# Patient Record
Sex: MALE | Race: OTHER | NOT HISPANIC OR LATINO
[De-identification: names, ages, dates, MRNs, and addresses within clinical notes are randomized per-mention and may not be internally consistent; named-entity substitution may affect disease eponyms.]

---

## 2018-01-02 ENCOUNTER — TRANSCRIPTION ENCOUNTER (OUTPATIENT)
Age: 23
End: 2018-01-02

## 2018-09-06 PROBLEM — Z00.00 ENCOUNTER FOR PREVENTIVE HEALTH EXAMINATION: Status: ACTIVE | Noted: 2018-09-06

## 2018-09-13 ENCOUNTER — APPOINTMENT (OUTPATIENT)
Dept: RADIOLOGY | Facility: CLINIC | Age: 23
End: 2018-09-13
Payer: COMMERCIAL

## 2018-09-13 ENCOUNTER — APPOINTMENT (OUTPATIENT)
Dept: MRI IMAGING | Facility: CLINIC | Age: 23
End: 2018-09-13
Payer: COMMERCIAL

## 2018-09-13 ENCOUNTER — OUTPATIENT (OUTPATIENT)
Dept: OUTPATIENT SERVICES | Facility: HOSPITAL | Age: 23
LOS: 1 days | End: 2018-09-13
Payer: COMMERCIAL

## 2018-09-13 DIAGNOSIS — Z00.8 ENCOUNTER FOR OTHER GENERAL EXAMINATION: ICD-10-CM

## 2018-09-13 PROCEDURE — 73722 MRI JOINT OF LWR EXTR W/DYE: CPT | Mod: 26,LT

## 2018-09-13 PROCEDURE — 77002 NEEDLE LOCALIZATION BY XRAY: CPT | Mod: 26,76

## 2018-09-13 PROCEDURE — 27093 INJECTION FOR HIP X-RAY: CPT | Mod: LT

## 2018-09-13 PROCEDURE — 77002 NEEDLE LOCALIZATION BY XRAY: CPT

## 2018-09-13 PROCEDURE — 27093 INJECTION FOR HIP X-RAY: CPT

## 2018-09-13 PROCEDURE — 77002 NEEDLE LOCALIZATION BY XRAY: CPT | Mod: 26

## 2018-09-13 PROCEDURE — 73722 MRI JOINT OF LWR EXTR W/DYE: CPT

## 2018-09-13 PROCEDURE — 73722 MRI JOINT OF LWR EXTR W/DYE: CPT | Mod: 26,RT

## 2019-11-27 ENCOUNTER — APPOINTMENT (OUTPATIENT)
Dept: RADIOLOGY | Facility: CLINIC | Age: 24
End: 2019-11-27

## 2019-11-27 ENCOUNTER — OUTPATIENT (OUTPATIENT)
Dept: OUTPATIENT SERVICES | Facility: HOSPITAL | Age: 24
LOS: 1 days | End: 2019-11-27
Payer: COMMERCIAL

## 2019-11-27 DIAGNOSIS — Z00.8 ENCOUNTER FOR OTHER GENERAL EXAMINATION: ICD-10-CM

## 2019-11-27 PROCEDURE — 72040 X-RAY EXAM NECK SPINE 2-3 VW: CPT

## 2019-11-27 PROCEDURE — 72040 X-RAY EXAM NECK SPINE 2-3 VW: CPT | Mod: 26

## 2019-11-27 PROCEDURE — 72070 X-RAY EXAM THORAC SPINE 2VWS: CPT | Mod: 26

## 2019-11-27 PROCEDURE — 72070 X-RAY EXAM THORAC SPINE 2VWS: CPT

## 2020-01-24 ENCOUNTER — OUTPATIENT (OUTPATIENT)
Dept: OUTPATIENT SERVICES | Facility: HOSPITAL | Age: 25
LOS: 1 days | End: 2020-01-24
Payer: COMMERCIAL

## 2020-01-24 ENCOUNTER — APPOINTMENT (OUTPATIENT)
Dept: MRI IMAGING | Facility: CLINIC | Age: 25
End: 2020-01-24
Payer: COMMERCIAL

## 2020-01-24 DIAGNOSIS — Z00.8 ENCOUNTER FOR OTHER GENERAL EXAMINATION: ICD-10-CM

## 2020-01-24 PROCEDURE — 72141 MRI NECK SPINE W/O DYE: CPT

## 2020-01-24 PROCEDURE — 72141 MRI NECK SPINE W/O DYE: CPT | Mod: 26

## 2022-04-12 ENCOUNTER — TRANSCRIPTION ENCOUNTER (OUTPATIENT)
Age: 27
End: 2022-04-12

## 2022-06-15 ENCOUNTER — NON-APPOINTMENT (OUTPATIENT)
Age: 27
End: 2022-06-15

## 2022-06-18 ENCOUNTER — NON-APPOINTMENT (OUTPATIENT)
Age: 27
End: 2022-06-18

## 2022-07-04 ENCOUNTER — NON-APPOINTMENT (OUTPATIENT)
Age: 27
End: 2022-07-04

## 2023-04-02 ENCOUNTER — NON-APPOINTMENT (OUTPATIENT)
Age: 28
End: 2023-04-02

## 2023-07-21 ENCOUNTER — APPOINTMENT (OUTPATIENT)
Dept: ORTHOPEDIC SURGERY | Facility: CLINIC | Age: 28
End: 2023-07-21
Payer: COMMERCIAL

## 2023-07-21 VITALS — HEIGHT: 71 IN | WEIGHT: 190 LBS | BODY MASS INDEX: 26.6 KG/M2

## 2023-07-21 DIAGNOSIS — M54.9 DORSALGIA, UNSPECIFIED: ICD-10-CM

## 2023-07-21 DIAGNOSIS — M41.126 ADOLESCENT IDIOPATHIC SCOLIOSIS, LUMBAR REGION: ICD-10-CM

## 2023-07-21 DIAGNOSIS — M41.124 ADOLESCENT IDIOPATHIC SCOLIOSIS, THORACIC REGION: ICD-10-CM

## 2023-07-21 DIAGNOSIS — M54.50 LOW BACK PAIN, UNSPECIFIED: ICD-10-CM

## 2023-07-21 DIAGNOSIS — M51.36 OTHER INTERVERTEBRAL DISC DEGENERATION, LUMBAR REGION: ICD-10-CM

## 2023-07-21 PROCEDURE — 99204 OFFICE O/P NEW MOD 45 MIN: CPT

## 2023-07-21 PROCEDURE — 72100 X-RAY EXAM L-S SPINE 2/3 VWS: CPT

## 2023-07-21 RX ORDER — IBUPROFEN 800 MG/1
800 TABLET, FILM COATED ORAL
Qty: 90 | Refills: 0 | Status: ACTIVE | COMMUNITY
Start: 2023-07-21 | End: 1900-01-01

## 2023-07-21 NOTE — DISCUSSION/SUMMARY
[Medication Risks Reviewed] : Medication risks reviewed [de-identified] : He has symptoms of recurrent lower back pain associated with a list and muscle spasm he describes is typical of symptoms from lumbar degenerative disc disease.  He will rest and use moist heat.  He has been started on ibuprofen 800 mg 3 times a day as a nonsteroidal anti-inflammatory with instructions to continue it for 2 or 3 days after his symptoms have fully resolved.  He will call if there are problems with the medication or worsening of his symptoms.  I will see him for follow-up in 3 to 4 weeks on a as needed basis.  When he is doing well and off the medicine for 10 days or so he has been given a prescription to visit a physical therapist for instruction in lower back exercises with hopes of decreasing the future frequency and severity of these symptoms.

## 2023-07-21 NOTE — PHYSICAL EXAM
[de-identified] : He is fully alert and oriented with a normal mood and affect.  He is in no acute distress as a take the history.  He ambulates with a normal gait including tiptoe and heel walking.  There are no cutaneous abnormalities or palpable bony defects of the spine.  There is no evidence of shortness of breath or respiratory distress.  On stance evaluation there is a mild elevation of the left shoulder with a level pelvis.  There is no paravertebral muscle spasm, sciatic notch tenderness or trochanteric tenderness.  Forward flexion of the spine there is a small right thoracic prominence and there is a minimal waistline asymmetry.  His lower extremity neurological examination revealed 1+ symmetrical reflexes.  Motor power is normal to manual testing in all lower extremity groups and sensation is normal to light touch in all dermatomes.  Straight leg raising is negative to 90 degrees in the sitting position bilaterally.  His hips and his knees have a full and painless range of motion with normal stability.  Vascular examination shows no evidence of varicosities and there is no lymphedema.  There are no cutaneous abnormalities of the upper extremities or of the lower extremities. [de-identified] : In light of his complaints AP and lateral x-rays of the lumbar spine are obtained.  There is a minimal scoliosis.  Sagittal alignment is normal.  There is no evidence of a spondylolysis.  Disc heights are well maintained.

## 2023-07-21 NOTE — HISTORY OF PRESENT ILLNESS
[de-identified] : 27-year-old male has a 6-year history of intermittent mid and lower back pain with muscle spasms that can occur up to once a year and frequently associated with a list but without leg pain.  5 days ago he had a recurrence of symptoms when bending over to  a towel.  He is having right-sided mid and lower back pain again without leg pain.  He has not had associated neurologic symptoms of numbness, paresthesias or weakness.  The pain can be worse coughing and sneezing but no worse forcing to move his bowels.  He denies having night pain but yet tells me the pain can be worse lying down.  The pain is not aggravated by sitting standing or walking.  Treatment has been rest and heat and ibuprofen over-the-counter but only 400 mg twice a day.\par \par His past medical history is positive only for history of heartburn symptoms in the past but he has not had that for more than 2 years.

## 2023-09-22 ENCOUNTER — APPOINTMENT (OUTPATIENT)
Dept: PSYCHIATRY | Facility: CLINIC | Age: 28
End: 2023-09-22
Payer: SELF-PAY

## 2023-09-22 PROCEDURE — 99205 OFFICE O/P NEW HI 60 MIN: CPT

## 2023-10-06 ENCOUNTER — APPOINTMENT (OUTPATIENT)
Dept: PSYCHIATRY | Facility: CLINIC | Age: 28
End: 2023-10-06
Payer: SELF-PAY

## 2023-10-06 PROCEDURE — 99214 OFFICE O/P EST MOD 30 MIN: CPT

## 2023-10-13 ENCOUNTER — NON-APPOINTMENT (OUTPATIENT)
Age: 28
End: 2023-10-13

## 2023-10-15 ENCOUNTER — NON-APPOINTMENT (OUTPATIENT)
Age: 28
End: 2023-10-15

## 2023-10-17 ENCOUNTER — APPOINTMENT (OUTPATIENT)
Dept: PSYCHIATRY | Facility: CLINIC | Age: 28
End: 2023-10-17
Payer: COMMERCIAL

## 2023-10-17 VITALS — HEART RATE: 61 BPM | DIASTOLIC BLOOD PRESSURE: 81 MMHG | SYSTOLIC BLOOD PRESSURE: 121 MMHG

## 2023-10-17 PROCEDURE — 99214 OFFICE O/P EST MOD 30 MIN: CPT

## 2023-10-17 RX ORDER — PROPRANOLOL HYDROCHLORIDE 10 MG/1
10 TABLET ORAL DAILY
Qty: 30 | Refills: 0 | Status: ACTIVE | COMMUNITY
Start: 2023-10-17 | End: 1900-01-01

## 2023-11-06 ENCOUNTER — APPOINTMENT (OUTPATIENT)
Dept: PSYCHIATRY | Facility: CLINIC | Age: 28
End: 2023-11-06
Payer: SELF-PAY

## 2023-11-06 PROCEDURE — 99214 OFFICE O/P EST MOD 30 MIN: CPT

## 2023-11-06 RX ORDER — ESCITALOPRAM OXALATE 5 MG/1
5 TABLET ORAL
Qty: 30 | Refills: 0 | Status: DISCONTINUED | COMMUNITY
Start: 2023-10-06 | End: 2023-11-06

## 2023-11-06 RX ORDER — ESCITALOPRAM OXALATE 10 MG/1
10 TABLET ORAL
Qty: 30 | Refills: 0 | Status: DISCONTINUED | COMMUNITY
Start: 2023-10-06 | End: 2023-11-06

## 2023-11-14 ENCOUNTER — APPOINTMENT (OUTPATIENT)
Dept: PSYCHIATRY | Facility: CLINIC | Age: 28
End: 2023-11-14

## 2023-11-27 ENCOUNTER — APPOINTMENT (OUTPATIENT)
Dept: PSYCHIATRY | Facility: CLINIC | Age: 28
End: 2023-11-27
Payer: SELF-PAY

## 2023-11-27 PROCEDURE — 99214 OFFICE O/P EST MOD 30 MIN: CPT

## 2023-12-04 ENCOUNTER — RX RENEWAL (OUTPATIENT)
Age: 28
End: 2023-12-04

## 2023-12-11 ENCOUNTER — APPOINTMENT (OUTPATIENT)
Dept: PSYCHIATRY | Facility: CLINIC | Age: 28
End: 2023-12-11

## 2023-12-14 ENCOUNTER — NON-APPOINTMENT (OUTPATIENT)
Age: 28
End: 2023-12-14

## 2023-12-19 ENCOUNTER — APPOINTMENT (OUTPATIENT)
Dept: PSYCHIATRY | Facility: CLINIC | Age: 28
End: 2023-12-19
Payer: SELF-PAY

## 2023-12-19 PROCEDURE — 99214 OFFICE O/P EST MOD 30 MIN: CPT

## 2023-12-19 NOTE — HISTORY OF PRESENT ILLNESS
[FreeTextEntry1] : Social: Pt said that he is doing better this week than last week. Said that the primary difficulty is with intrusive thoughts. Said that he had prior testing for ADHD that was negative. Taiwo has been in law school and visiting on weekends during which times pt feels pressured to "keep it together", then feels guilty for feeling better when she is in law school. However, she has been on winter break all this past week and around, so pt attributes feeling better to being exposed more to his fialicja. Work got busier in the past 3 year. Denies gambling. Going on vacation with family to [place] soon, then to [place] with taiwo. Therapy: Ongoing therapy with Ray Campbell.   Medications: Lexapro: feels it helps with intrusive thoughts and anxiety. Issue with delayed ejaculation, not helped by Wellbutrin. Discussed the option to cross to Viibryd for reducing sexual side effect burden.  Wellbutrin: helping with energy/motivation. Trazodone: taking 100mg nightly, helps, still with residual late insomnia.  Mood: "pretty good, more optimistic" Intrusive thoughts: feeling "guilty", or that he will "be like this forever". Last week was spending 70-75% of the day having these thoughts. This week it is down to 30-40% of the day.  Uses what he learns in therapy to let thoughts just go, label the thoughts as anxious thoughts, repeat healthier thoughts.  Tries to accept thoughts and not block them out.  Reassurance seeking: decreased from other people. Makes feel better in the moment, but not the long term.  Anxiety: Currently 3/10, last week was 6/10. Panic attacks: denies Motivation: better, Wellbutrin helped Anhedonia: denies, enjoys seeing friends Appetite: decreased since started Wellbutrin XL Sleep: asleep 11pm, awake 6:30-7am, wants to wake up 7:30am. One interruption to use the bathroom.  Energy: better Focus: improved  Guilt/worthlessness: endorses during interval Thoughts of death: passive thoughts of wanting to "give up" last week, denies intent or plan.  Thoughts of harming self: denies Thoughts of harming others: denies  EtOH use: denies Drug use: denies Tobacco use: denies Caffeine use: 3 sodas per week.

## 2023-12-19 NOTE — PHYSICAL EXAM
[None] : none [Cooperative] : cooperative [Euthymic] : euthymic [Anxious] : anxious [Full] : full [Clear] : clear [Linear/Goal Directed] : linear/goal directed [Average] : average [WNL] : within normal limits [FreeTextEntry8] : "pretty good, more optimistic" [de-identified] : anxious to euthymic, congruent with stated mood, ranging to smiling, appropriate to content [FreeTextEntry7] : denies SIIP/NSSIIP/HIIP, no overt delusions

## 2023-12-19 NOTE — PLAN
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  On initial assessment, notably, pt had period of 2-3 weeks of anxious mood, amotivation/anhedonia, low appetite, low energy, poor focus, feelings of guilt, middle insomnia, in the context of intrusive thoughts about infidelity and talking with his partner about this. Given the family of of OCD (father, two sisters) and depression (mother), the differential includes OCD, MDD, gambling disorder, r/o anxiety (though anxiety is situationally related to intrusive thoughts, not general). Continuing Lexapro titration, partial improvement though side effects notable for nausea and dry mouth, anxiety.  10/6/2023: Anxiety and depressive symptom improved on Lexapro, intrusive thoughts decreased from 80% of time to 50% of time. Increasing Lexapro to 15mg daily dose. Delayed ejaculation as notable side effect to monitor. 10/17/2023: Discussed that transient side effects may persist for 2 weeks after dose increase, pt in agreement with the plan to maintain current dose of Lexapro 15mg nightly currently, with option to [facility]'s [facility] to be revisited in the future as needed, though Lexapro has had partial benefit and dose is not yet optimized. Pt in agreement with the plan to maintain at 15mg for 3 more weeks. 11/6/2023: Primary side effect of delayed ejaculation, denies other side effects as transient increase in anxiety resolved within 2 weeks or so of having increased the dose to Lexapro 15mg daily. Discussed the option for Wellbutrin in the future as an adjunct for sexual side effects. Anxiety improving 3/10 currently, intrusive thoughts 5-10 times a day of guilt about his past when around his fiance. Plan to increase Lexapro to 20mg daily to address intrusive thoughts. 11/27/2023: Low motivation, low energy, feeling "flat". To start Wellbutrin XL 150mg daily as an adjunct. 12/18/2023: Crossing from Lexapro to Viibryd due to delayed ejaculation, start Viibryd 10mg daily, decrease Lexapro to 10mg daily. Continuing Wellbutrin XL 150mg daily, and trazodone at bedtime as needed for sleep.  PLAN -Discussed the diagnosis, treatment, alternatives to recommended treatment, risk Vs benefits of treatment and no treatment and alternative treatments. -Medication: Decrease Lexapro to 10mg daily. Start Viibryd 10mg daily. Continue Wellbutrin XL 150mg. Continue trazodone 50mg QHS prn for sleep.  Discussed risks and benefits of Wellbutrin including but not limited to decreased seizure threshold, dizziness, lightheadedness, insomnia, dry mouth, constipation, weight loss, anorexia, hypertension, sweating, rash, GI upset, tremor, tinnitus, induction of verito.  Trazodone side effects including but not limited to sedation, dry mouth constipation, headache, serotonin syndrome, orthostatic hypotension and priapism discussed.  SSRI side effects including but not limited to GI side effects, headaches, dizziness, serotonin syndrome, hyponatremia, QT prolongation, weight gain, decreased libido, and black box warning of SI in patients younger than 24 were discussed. Propranolol side effects discussed including risk to lower heart rate and cause orthostasis/hypotension. -Discussed recommendation for aerobic exercise -Discussed sleep hygiene -Discussed aromatherapy for sleep and anxiety -Educated patient of importance of remaining abstinent from drugs and alcohol. -Emergency procedures were discussed: pt. educated to call 911 or go to nearest ER for worsening of symptoms/suicidal/homicidal ideation. -Seeing Dr. Ray Campbell for therapy. -RTC in 2 weeks or earlier as needed -Patient given opportunity to ask questions and their questions were answered and they expressed understanding and agreement with above plan.

## 2024-01-02 ENCOUNTER — APPOINTMENT (OUTPATIENT)
Dept: PSYCHIATRY | Facility: CLINIC | Age: 29
End: 2024-01-02
Payer: COMMERCIAL

## 2024-01-02 PROCEDURE — 99214 OFFICE O/P EST MOD 30 MIN: CPT

## 2024-01-02 RX ORDER — BUPROPION HYDROCHLORIDE 150 MG/1
150 TABLET, EXTENDED RELEASE ORAL DAILY
Qty: 30 | Refills: 0 | Status: DISCONTINUED | COMMUNITY
Start: 2023-11-27 | End: 2024-01-02

## 2024-01-02 RX ORDER — VILAZODONE HYDROCHLORIDE 10 MG/1
10 TABLET, FILM COATED ORAL
Qty: 30 | Refills: 0 | Status: DISCONTINUED | COMMUNITY
Start: 2023-12-19 | End: 2024-01-02

## 2024-01-02 NOTE — PHYSICAL EXAM
[None] : none [Cooperative] : cooperative [Euthymic] : euthymic [Anxious] : anxious [Full] : full [Clear] : clear [Linear/Goal Directed] : linear/goal directed [Average] : average [WNL] : within normal limits [FreeTextEntry8] : "pretty good, more optimistic" [de-identified] : anxious to euthymic, congruent with stated mood, ranging to smiling, appropriate to content [FreeTextEntry7] : denies SIIP/NSSIIP/HIIP, no overt delusions

## 2024-01-02 NOTE — PHYSICAL EXAM
[None] : none [Cooperative] : cooperative [Euthymic] : euthymic [Anxious] : anxious [Full] : full [Clear] : clear [Linear/Goal Directed] : linear/goal directed [Average] : average [WNL] : within normal limits [FreeTextEntry8] : "pretty good, more optimistic" [de-identified] : anxious to euthymic, congruent with stated mood, ranging to smiling, appropriate to content [FreeTextEntry7] : denies SIIP/NSSIIP/HIIP, no overt delusions

## 2024-01-02 NOTE — PLAN
[FreeTextEntry5] : On initial assessment 9/22/2023: 29 yo M, domiciled alone in 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  On initial assessment, notably, pt had period of 2-3 weeks of anxious mood, amotivation/anhedonia, low appetite, low energy, poor focus, feelings of guilt, middle insomnia, in the context of intrusive thoughts about infidelity and talking with his partner about this. Given the family of of OCD (father, two sisters) and depression (mother), the differential includes OCD, MDD, gambling disorder, r/o anxiety (though anxiety is situationally related to intrusive thoughts, not general). Continuing Lexapro titration, partial improvement though side effects notable for nausea and dry mouth, anxiety.  10/6/2023: Anxiety and depressive symptom improved on Lexapro, intrusive thoughts decreased from 80% of time to 50% of time. Increasing Lexapro to 15mg daily dose. Delayed ejaculation as notable side effect to monitor. 10/17/2023: Discussed that transient side effects may persist for 2 weeks after dose increase, pt in agreement with the plan to maintain current dose of Lexapro 15mg nightly currently, with [facility] to be revisited in the future as needed, though Lexapro has had partial benefit and dose is not yet optimized. Pt in agreement with the plan to maintain at 15mg for 3 more weeks. 11/6/2023: Primary side effect of delayed ejaculation, denies other side effects as transient increase in anxiety resolved within 2 weeks or so of having increased the dose to Lexapro 15mg daily. Discussed the option for Wellbutrin in the future as an adjunct for sexual side effects. Anxiety improving 3/10 currently, intrusive thoughts 5-10 times a day of guilt about his past when around his fiance. Plan to increase Lexapro to 20mg daily to address intrusive thoughts. 11/27/2023: Low motivation, low energy, feeling "flat". To start Wellbutrin XL 150mg daily as an adjunct. 12/18/2023: Crossing from Lexapro to Viibryd due to delayed ejaculation, start Viibryd 10mg daily, decrease Lexapro to 10mg daily. Continuing Wellbutrin XL 150mg daily, and trazodone at bedtime as needed for sleep. 1/2/2024: Did not start Viibryd due to financial cost. Partial efficacy of Lexapro, wants to stop Wellbutrin due to concern for increased anxiety. Plan to stop Wellbutrin. Continue Lexapro 20mg daily for now, pt to consider genetic testing to guide choice of switch. Also considering Prozac since it helped with family members. Continuing trazodone 50mg to 100mg nightly for insomnia.  PLAN -Discussed the diagnosis, treatment, alternatives to recommended treatment, risk Vs benefits of treatment and no treatment and alternative treatments. -Medication: Continue Lexapro 20mg daily. Stop Wellbutrin. . Continue trazodone 50mg to 100mg QHS prn for sleep.  Discussed risks and benefits of Wellbutrin including but not limited to decreased seizure threshold, dizziness, lightheadedness, insomnia, dry mouth, constipation, weight loss, anorexia, hypertension, sweating, rash, GI upset, tremor, tinnitus, induction of verito.  Trazodone side effects including but not limited to sedation, dry mouth constipation, headache, serotonin syndrome, orthostatic hypotension and priapism discussed.  SSRI side effects including but not limited to GI side effects, headaches, dizziness, serotonin syndrome, hyponatremia, QT prolongation, weight gain, decreased libido, and black box warning of SI in patients younger than 24 were discussed. Propranolol side effects discussed including risk to lower heart rate and cause orthostasis/hypotension. -Discussed recommendation for aerobic exercise -Discussed sleep hygiene -Discussed aromatherapy for sleep and anxiety -Educated patient of importance of remaining abstinent from drugs and alcohol. -Emergency procedures were discussed: pt. educated to call 911 or go to nearest ER for worsening of symptoms/suicidal/homicidal ideation. -Seeing Dr. Ray Campbell for therapy. -RTC in 1-2 weeks or earlier as needed -Patient given opportunity to ask questions and their questions were answered and they expressed understanding and agreement with above plan.

## 2024-01-02 NOTE — HISTORY OF PRESENT ILLNESS
[FreeTextEntry1] : Social: The past week anxiety has been worse. Going to [place] on vacation.  Medications: Lexapro: Pt stayed on Lexapro due to cost of Viibryd being too high, said would cost $175. Wants to stop Lexapro, but is considering genetic testing to guide the switch in antidepressant.  Trazodone: taking 50 to 100mg nightly, find it helps. Wellbutrin: Taking 150mg in the morning, but wants to stop.   Mood: "anxious" Intrusive thoughts: ongoing, 80-90% feeling guilty.  Compulsions: decreasing reassurance seeking, decreased googling. Focusing on acceptance. Gambling: denies Anxiety: worse in the past week. worse in the mornings, immediately feels very anxious in the morning with heart racing, wakes up feeling like this. Panic attacks: denies Motivation: overall good Anhedonia: denies Appetite: worse Sleep: asleep 11pm, awake 6:30-7am, wants to wake up 1-1.5 hours later. 2 interruptions Energy: fine Focus: worse when anxious Guilt/worthlessness: endorses Thoughts of death: denies Thoughts of harming self: denies Thoughts of harming others: denies  EtOH use: denies Drug use: denies Tobacco use: denies Caffeine use: 1 soda per week

## 2024-01-02 NOTE — PLAN
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  On initial assessment, notably, pt had period of 2-3 weeks of anxious mood, amotivation/anhedonia, low appetite, low energy, poor focus, feelings of guilt, middle insomnia, in the context of intrusive thoughts about infidelity and talking with his partner about this. Given the family of of OCD (father, two sisters) and depression (mother), the differential includes OCD, MDD, gambling disorder, r/o anxiety (though anxiety is situationally related to intrusive thoughts, not general). Continuing Lexapro titration, partial improvement though side effects notable for nausea and dry mouth, anxiety.  10/6/2023: Anxiety and depressive symptom improved on Lexapro, intrusive thoughts decreased from 80% of time to 50% of time. Increasing Lexapro to 15mg daily dose. Delayed ejaculation as notable side effect to monitor. 10/17/2023: Discussed that transient side effects may persist for 2 weeks after dose increase, pt in agreement with the plan to maintain current dose of Lexapro 15mg nightly currently, with [facility]'s [facility] to be revisited in the future as needed, though Lexapro has had partial benefit and dose is not yet optimized. Pt in agreement with the plan to maintain at 15mg for 3 more weeks. 11/6/2023: Primary side effect of delayed ejaculation, denies other side effects as transient increase in anxiety resolved within 2 weeks or so of having increased the dose to Lexapro 15mg daily. Discussed the option for Wellbutrin in the future as an adjunct for sexual side effects. Anxiety improving 3/10 currently, intrusive thoughts 5-10 times a day of guilt about his past when around his fiance. Plan to increase Lexapro to 20mg daily to address intrusive thoughts. 11/27/2023: Low motivation, low energy, feeling "flat". To start Wellbutrin XL 150mg daily as an adjunct. 12/18/2023: Crossing from Lexapro to Viibryd due to delayed ejaculation, start Viibryd 10mg daily, decrease Lexapro to 10mg daily. Continuing Wellbutrin XL 150mg daily, and trazodone at bedtime as needed for sleep. 1/2/2024: Did not start Viibryd due to financial cost. Partial efficacy of Lexapro, wants to stop Wellbutrin due to concern for increased anxiety. Plan to stop Wellbutrin. Continue Lexapro 20mg daily for now, pt to consider genetic testing to guide choice of switch. Also considering Prozac since it helped with family members. Continuing trazodone 50mg to 100mg nightly for insomnia.  PLAN -Discussed the diagnosis, treatment, alternatives to recommended treatment, risk Vs benefits of treatment and no treatment and alternative treatments. -Medication: Continue Lexapro 20mg daily. Stop Wellbutrin. . Continue trazodone 50mg to 100mg QHS prn for sleep.  Discussed risks and benefits of Wellbutrin including but not limited to decreased seizure threshold, dizziness, lightheadedness, insomnia, dry mouth, constipation, weight loss, anorexia, hypertension, sweating, rash, GI upset, tremor, tinnitus, induction of verito.  Trazodone side effects including but not limited to sedation, dry mouth constipation, headache, serotonin syndrome, orthostatic hypotension and priapism discussed.  SSRI side effects including but not limited to GI side effects, headaches, dizziness, serotonin syndrome, hyponatremia, QT prolongation, weight gain, decreased libido, and black box warning of SI in patients younger than 24 were discussed. Propranolol side effects discussed including risk to lower heart rate and cause orthostasis/hypotension. -Discussed recommendation for aerobic exercise -Discussed sleep hygiene -Discussed aromatherapy for sleep and anxiety -Educated patient of importance of remaining abstinent from drugs and alcohol. -Emergency procedures were discussed: pt. educated to call 911 or go to nearest ER for worsening of symptoms/suicidal/homicidal ideation. -Seeing Dr. Ray Campbell for therapy. -RTC in 1-2 weeks or earlier as needed -Patient given opportunity to ask questions and their questions were answered and they expressed understanding and agreement with above plan.

## 2024-01-04 NOTE — DISCUSSION/SUMMARY
[FreeTextEntry1] : Writer called pt to provide information for contacting [facility] [phone] so that pt can determine the cost of genetic testing. Nobody picked up, left voice message.

## 2024-01-04 NOTE — DISCUSSION/SUMMARY
[FreeTextEntry1] : Writer called pt to provide information for contacting [facility] [phone] so that pt can determine the cost of genetic testing. Nobody picked up, left voice message.

## 2024-01-23 ENCOUNTER — APPOINTMENT (OUTPATIENT)
Dept: PSYCHIATRY | Facility: CLINIC | Age: 29
End: 2024-01-23

## 2024-01-23 NOTE — DISCUSSION/SUMMARY
[FreeTextEntry1] : Writer called pt since pt was not showing up for 8:30am appt, pt said that he had thought the appt was Thursday instead of today, asked to reschedule. Pt indicated that he is probably not interested in pursuing genetic testing at this time, will reschedule the missed appt to discuss medication options. No acute issues elicited at this time.

## 2024-01-29 NOTE — PLAN
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  On initial assessment, notably, pt had period of 2-3 weeks of anxious mood, amotivation/anhedonia, low appetite, low energy, poor focus, feelings of guilt, middle insomnia, in the context of intrusive thoughts about infidelity and talking with his partner about this. Given the family of of OCD (father, two sisters) and depression (mother), the differential includes OCD, MDD, gambling disorder, r/o anxiety (though anxiety is situationally related to intrusive thoughts, not general). Continuing Lexapro titration, partial improvement though side effects notable for nausea and dry mouth, anxiety.  10/6/2023: Anxiety and depressive symptom improved on Lexapro, intrusive thoughts decreased from 80% of time to 50% of time. Increasing Lexapro to 15mg daily dose. Delayed ejaculation as notable side effect to monitor. 10/17/2023: Discussed that transient side effects may persist for 2 weeks after dose increase, pt in agreement with the plan to maintain current dose of Lexapro 15mg nightly currently, with [facility] to be revisited in the future as needed, though Lexapro has had partial benefit and dose is not yet optimized. Pt in agreement with the plan to maintain at 15mg for 3 more weeks. 11/6/2023: Primary side effect of delayed ejaculation, denies other side effects as transient increase in anxiety resolved within 2 weeks or so of having increased the dose to Lexapro 15mg daily. Discussed the option for Wellbutrin in the future as an adjunct for sexual side effects. Anxiety improving 3/10 currently, intrusive thoughts 5-10 times a day of guilt about his past when around his fiance. Plan to increase Lexapro to 20mg daily to address intrusive thoughts. 11/27/2023: Low motivation, low energy, feeling "flat". To start Wellbutrin XL 150mg daily as an adjunct. 12/18/2023: Crossing from Lexapro to Viibryd due to delayed ejaculation, start Viibryd 10mg daily, decrease Lexapro to 10mg daily. Continuing Wellbutrin XL 150mg daily, and trazodone at bedtime as needed for sleep. 1/2/2024: Did not start Viibryd due to financial cost. Partial efficacy of Lexapro, wants to stop Wellbutrin due to concern for increased anxiety. Plan to stop Wellbutrin. Continue Lexapro 20mg daily for now, pt to consider genetic testing to guide choice of switch. Also considering Prozac since it helped with family members. Continuing trazodone 50mg to 100mg nightly for insomnia. 1/8/2024:  PLAN -Discussed the diagnosis, treatment, alternatives to recommended treatment, risk Vs benefits of treatment and no treatment and alternative treatments. -Medication: Continue Lexapro 20mg daily. Stop Wellbutrin.. Continue trazodone 50mg to 100mg QHS prn for sleep.  Discussed risks and benefits of Wellbutrin including but not limited to decreased seizure threshold, dizziness, lightheadedness, insomnia, dry mouth, constipation, weight loss, anorexia, hypertension, sweating, rash, GI upset, tremor, tinnitus, induction of verito.  Trazodone side effects including but not limited to sedation, dry mouth constipation, headache, serotonin syndrome, orthostatic hypotension and priapism discussed.  SSRI side effects including but not limited to GI side effects, headaches, dizziness, serotonin syndrome, hyponatremia, QT prolongation, weight gain, decreased libido, and black box warning of SI in patients younger than 24 were discussed. Propranolol side effects discussed including risk to lower heart rate and cause orthostasis/hypotension. -Discussed recommendation for aerobic exercise -Discussed sleep hygiene -Discussed aromatherapy for sleep and anxiety -Educated patient of importance of remaining abstinent from drugs and alcohol. -Emergency procedures were discussed: pt. educated to call 911 or go to nearest ER for worsening of symptoms/suicidal/homicidal ideation. -Seeing Dr. Ray Campbell for therapy. -RTC in 1-2 weeks or earlier as needed -Patient given opportunity to ask questions and their questions were answered and they expressed understanding and agreement with above plan.

## 2024-01-29 NOTE — HISTORY OF PRESENT ILLNESS
[FreeTextEntry1] : Social: Discussed option for Genom[facility] genetic testing for medication.  Medications: Lexapro: Wellbutrin: Trazodone: Genomind:  Mood: Intrusive Thoughts: Compulsions: Anxiety: Panic attacks: Motivation: Anhedonia: Appetite: Sleep: Energy: Focus: Guilt/worthlessness: Thoughts of death: Thoughts of harming self: Thoughts of harming others:  EtOH use: Drug use: Tobacco use: Caffeine use:

## 2024-01-29 NOTE — PLAN
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  On initial assessment, notably, pt had period of 2-3 weeks of anxious mood, amotivation/anhedonia, low appetite, low energy, poor focus, feelings of guilt, middle insomnia, in the context of intrusive thoughts about infidelity and talking with his partner about this. Given the family of of OCD (father, two sisters) and depression (mother), the differential includes OCD, MDD, gambling disorder, r/o anxiety (though anxiety is situationally related to intrusive thoughts, not general). Continuing Lexapro titration, partial improvement though side effects notable for nausea and dry mouth, anxiety.  10/6/2023: Anxiety and depressive symptom improved on Lexapro, intrusive thoughts decreased from 80% of time to 50% of time. Increasing Lexapro to 15mg daily dose. Delayed ejaculation as notable side effect to monitor. 10/17/2023: Discussed that transient side effects may persist for 2 weeks after dose increase, pt in agreement with the plan to maintain current dose of Lexapro 15mg nightly currently, [facility] to be revisited in the future as needed, though Lexapro has had partial benefit and dose is not yet optimized. Pt in agreement with the plan to maintain at 15mg for 3 more weeks. 11/6/2023: Primary side effect of delayed ejaculation, denies other side effects as transient increase in anxiety resolved within 2 weeks or so of having increased the dose to Lexapro 15mg daily. Discussed the option for Wellbutrin in the future as an adjunct for sexual side effects. Anxiety improving 3/10 currently, intrusive thoughts 5-10 times a day of guilt about his past when around his fiance. Plan to increase Lexapro to 20mg daily to address intrusive thoughts. 11/27/2023: Low motivation, low energy, feeling "flat". To start Wellbutrin XL 150mg daily as an adjunct. 12/18/2023: Crossing from Lexapro to Viibryd due to delayed ejaculation, start Viibryd 10mg daily, decrease Lexapro to 10mg daily. Continuing Wellbutrin XL 150mg daily, and trazodone at bedtime as needed for sleep. 1/2/2024: Did not start Viibryd due to financial cost. Partial efficacy of Lexapro, wants to stop Wellbutrin due to concern for increased anxiety. Plan to stop Wellbutrin. Continue Lexapro 20mg daily for now, pt to consider genetic testing to guide choice of switch. Also considering Prozac since it helped with family members. Continuing trazodone 50mg to 100mg nightly for insomnia. 1/8/2024:  PLAN -Discussed the diagnosis, treatment, alternatives to recommended treatment, risk Vs benefits of treatment and no treatment and alternative treatments. -Medication: Continue Lexapro 20mg daily. Stop Wellbutrin.. Continue trazodone 50mg to 100mg QHS prn for sleep.  Discussed risks and benefits of Wellbutrin including but not limited to decreased seizure threshold, dizziness, lightheadedness, insomnia, dry mouth, constipation, weight loss, anorexia, hypertension, sweating, rash, GI upset, tremor, tinnitus, induction of verito.  Trazodone side effects including but not limited to sedation, dry mouth constipation, headache, serotonin syndrome, orthostatic hypotension and priapism discussed.  SSRI side effects including but not limited to GI side effects, headaches, dizziness, serotonin syndrome, hyponatremia, QT prolongation, weight gain, decreased libido, and black box warning of SI in patients younger than 24 were discussed. Propranolol side effects discussed including risk to lower heart rate and cause orthostasis/hypotension. -Discussed recommendation for aerobic exercise -Discussed sleep hygiene -Discussed aromatherapy for sleep and anxiety -Educated patient of importance of remaining abstinent from drugs and alcohol. -Emergency procedures were discussed: pt. educated to call 911 or go to nearest ER for worsening of symptoms/suicidal/homicidal ideation. -Seeing Dr. Ray Campbell for therapy. -RTC in 1-2 weeks or earlier as needed -Patient given opportunity to ask questions and their questions were answered and they expressed understanding and agreement with above plan.

## 2024-02-08 ENCOUNTER — APPOINTMENT (OUTPATIENT)
Dept: PSYCHIATRY | Facility: CLINIC | Age: 29
End: 2024-02-08
Payer: SELF-PAY

## 2024-02-08 PROCEDURE — 99214 OFFICE O/P EST MOD 30 MIN: CPT

## 2024-02-08 NOTE — HISTORY OF PRESENT ILLNESS
[FreeTextEntry1] : Social: Went to [place] for work recently, and saw grandparents. Basketball season ended, to resume in Spring. Doing [employer] training once a week. Overall works out 4 times per week.  ISTOP reviewed. Pt saw Fei Eason during the interval, an outpatient psychiatrist, does not want to continue with him. Was prescribed Klonopin 0.5mg 30 quantity for 15 days.  Medications: Lexapro: side effect of delayed ejaculation has improved. However, feels that intrusive thoughts could be better. Denies other side effect. Discussed the plan to increase to 30mg daily, and start Klonopin 0.5mg daily as needed for anxiety given hx of anxiety and nausea with prior dose increases.  Trazodone: taking nightly, one night did not take it and woke up 3am. 70% of the time takes 100mg, other 30% takes 50mg.  Wellbutrin: stopped, no issues since stopping  Intrusive thoughts 75% of the day about pt feeling he is holding in secret about his past regrets, and second most is concern that he will not get back to normal. Mood: "ok" Anxiety: 6/10 Panic attacks: denies Motivation: "lately not great" Anhedonia: denies Appetite: "fine" Sleep: asleep 11:30pm, waking up 6:30am wants to wake up later +late insomnia. 1-2 interruptions to use the restroom. Energy: lower than normal Focus: lower than normal Guilt/worthlessness: endorses feelings of guilt, less feelings of worthlessness.  Thoughts of death: denies Thoughts of harming self: denies Thoughts of harming others: denies  EtOH use: denies Drug use: denies Tobacco use: denies Caffeine use: soda once per week, denies coffee, one tea every other week caffeinated.

## 2024-02-08 NOTE — PHYSICAL EXAM
[None] : none [Cooperative] : cooperative [Euthymic] : euthymic [Anxious] : anxious [Full] : full [Clear] : clear [Linear/Goal Directed] : linear/goal directed [Average] : average [WNL] : within normal limits [FreeTextEntry8] : "pretty good, more optimistic" [de-identified] : anxious to euthymic, congruent with stated mood, ranging to smiling, appropriate to content [FreeTextEntry7] : denies SIIP/NSSIIP/HIIP, no overt delusions

## 2024-02-08 NOTE — PLAN
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  On initial assessment, notably, pt had period of 2-3 weeks of anxious mood, amotivation/anhedonia, low appetite, low energy, poor focus, feelings of guilt, middle insomnia, in the context of intrusive thoughts about infidelity and talking with his partner about this. Given the family of of OCD (father, two sisters) and depression (mother), the differential includes OCD, MDD, gambling disorder, r/o anxiety (though anxiety is situationally related to intrusive thoughts, not general). Continuing Lexapro titration, partial improvement though side effects notable for nausea and dry mouth, anxiety.  10/6/2023: Anxiety and depressive symptom improved on Lexapro, intrusive thoughts decreased from 80% of time to 50% of time. Increasing Lexapro to 15mg daily dose. Delayed ejaculation as notable side effect to monitor. 10/17/2023: Discussed that transient side effects may persist for 2 weeks after dose increase, pt in agreement with the plan to maintain current dose of Lexapro 15mg nightly currently, [facility] to be revisited in the future as needed, though Lexapro has had partial benefit and dose is not yet optimized. Pt in agreement with the plan to maintain at 15mg for 3 more weeks. 11/6/2023: Primary side effect of delayed ejaculation, denies other side effects as transient increase in anxiety resolved within 2 weeks or so of having increased the dose to Lexapro 15mg daily. Discussed the option for Wellbutrin in the future as an adjunct for sexual side effects. Anxiety improving 3/10 currently, intrusive thoughts 5-10 times a day of guilt about his past when around his fiance. Plan to increase Lexapro to 20mg daily to address intrusive thoughts. 11/27/2023: Low motivation, low energy, feeling "flat". To start Wellbutrin XL 150mg daily as an adjunct. 12/18/2023: Crossing from Lexapro to Viibryd due to delayed ejaculation, start Viibryd 10mg daily, decrease Lexapro to 10mg daily. Continuing Wellbutrin XL 150mg daily, and trazodone at bedtime as needed for sleep. 1/2/2024: Did not start Viibryd due to financial cost. Partial efficacy of Lexapro, wants to stop Wellbutrin due to concern for increased anxiety. Plan to stop Wellbutrin. Continue Lexapro 20mg daily for now, pt to consider genetic testing to guide choice of switch. Also considering Prozac since it helped with family members. Continuing trazodone 50mg to 100mg nightly for insomnia. 2/8/2024: Anxiety 6/10, intrusive thoughts 75% of the time. Plan to increase Lexapro to 30mg daily, pt received Klonopin 0.5mg prescription, to take as needed for anxiety induced by transient side effects of dose increase.  PLAN -Discussed the diagnosis, treatment, alternatives to recommended treatment, risk Vs benefits of treatment and no treatment and alternative treatments. -Medication: Increase Lexapro to 30mg daily. Stopped Wellbutrin.. Continue trazodone 50mg to 100mg QHS prn for sleep. Klonopin 0.5mg daily as needed for anxiety. BZD side effects: Discussed with patient adverse effects of longer term/frequent use of BZD, potential to develop tolerance to the dose effects and develop dependence, and potential for addiction. Advise patient not to drive or operate heavy machinery immediately after taking the medication. Also educated patient about safe use/and keeping meds safely, and to not share medications with family/friends. Advised not to combine BZD with EtOH.  Discussed risks and benefits of Wellbutrin including but not limited to decreased seizure threshold, dizziness, lightheadedness, insomnia, dry mouth, constipation, weight loss, anorexia, hypertension, sweating, rash, GI upset, tremor, tinnitus, induction of verito.  Trazodone side effects including but not limited to sedation, dry mouth constipation, headache, serotonin syndrome, orthostatic hypotension and priapism discussed.  SSRI side effects including but not limited to GI side effects, headaches, dizziness, serotonin syndrome, hyponatremia, QT prolongation, weight gain, decreased libido, and black box warning of SI in patients younger than 24 were discussed. Propranolol side effects discussed including risk to lower heart rate and cause orthostasis/hypotension. -Discussed recommendation for aerobic exercise -Discussed sleep hygiene -Discussed aromatherapy for sleep and anxiety -Educated patient of importance of remaining abstinent from drugs and alcohol. -Emergency procedures were discussed: pt. educated to call 911 or go to nearest ER for worsening of symptoms/suicidal/homicidal ideation. -Seeing Dr. Ray Campbell for therapy. -RTC in 4 weeks or earlier as needed -Patient given opportunity to ask questions and their questions were answered and they expressed understanding and agreement with above plan.  ISTOP Reference #: [id number]  Practitioner Count: 1 Pharmacy Count: 1 Current Opioid Prescriptions: 0 Current Benzodiazepine Prescriptions: 1 Current Stimulant Prescriptions: 0   Patient Demographic Information (PDI)     PDI	First Name	Last Name	Birth Date	Gender	Street Address	[facility]	Zip Code [place]	Eusebio Avilesantony	1995	Male	[address] [place] [facility]	[id number]  Prescription Information    PDI Filter:   PDI	My Rx	Current Rx	Drug Type	Rx Written	Rx Dispensed	Drug	Quantity	Days Supply	Prescriber Name	Prescriber JACOB #	Payment Method	Dispenser A	N	Y	B	01/23/2024	01/25/2024	clonazepam 0.5 mg tablet	30	15	Fei Quiros	[id number]	Insurance	[facility] #[id number]

## 2024-03-05 ENCOUNTER — APPOINTMENT (OUTPATIENT)
Dept: PSYCHIATRY | Facility: CLINIC | Age: 29
End: 2024-03-05
Payer: SELF-PAY

## 2024-03-05 PROCEDURE — 99214 OFFICE O/P EST MOD 30 MIN: CPT

## 2024-03-05 PROCEDURE — G2211 COMPLEX E/M VISIT ADD ON: CPT

## 2024-03-05 NOTE — PHYSICAL EXAM
[None] : none [Cooperative] : cooperative [Anxious] : anxious [Full] : full [Clear] : clear [Linear/Goal Directed] : linear/goal directed [Average] : average [WNL] : within normal limits [de-identified] : anxious, congruent with stated mood, ranging to smiling, appropriate to content [FreeTextEntry7] : denies SIIP/NSSIIP/HIIP, no overt delusions

## 2024-03-05 NOTE — HISTORY OF PRESENT ILLNESS
[FreeTextEntry1] : Social: ISTOP reviewed Seeing Dr. Campbell weekly, pt feels it is helpful for learning about his obsessions.  Medications: Lexapro 30mg: side effect of delayed ejaculation, otherwise tolerated well. Was feeling better, but for the past week had high anxiety and intrusive thoughts. Started feeling better about 1 week after starting 30mg, felt good for 2.5 weeks, then felt poorly for 1 week.  Advised to check EKG and BMP with PCP. Klonopin 0.5mg: has not taken it. Trazodone 50mg to 100mg: taking 50mg nightly, once a week or so 100mg.  Intrusive thoughts: present for 75% of the time for the past week, for weeks prior was about 30% Compulsions: confessing stuff to fiance that does not seem needed to talk about, reassurance seeking from fiance. Mood: "not great" Anxiety: 6/10 Panic attacks: denies Motivation: decreased Anhedonia: denies Appetite: high Sleep: asleep 11:30pm, waking up 6:30am wants to wake up later +late insomnia. 1-2 interruptions to use the restroom. Energy: low Focus: low Guilt/worthlessness: feelings of guilt Thoughts of death: denies Thoughts of harming self: denies Thoughts of harming others: denies  EtOH use: denies Drug use: denies Tobacco use: denies Caffeine use: soda once per week, no coffee  A and B student in school, at [employer] got 3.3. Prior ADHD diagnosis: denies. Said that he was "a troublemaker, very hyper" when he was younger. Sister and father have ADHD. Pt was tested at age 7 or 8 years old and pt was not diagnosed with ADHD.  Symptoms of ADHD prior to age 13: Inattention- Careless mistakes or lack of close attention to details: denies Difficulty sustaining attention: denies Not listen when spoken to directly: denies Does not follow through on instructions: denies Difficulty organizing tasks and activities: denies Avoids, dislikes or is reluctant to engage in tasks with sustained mental effort: denies Loses necessary things for tasks: denies Easily distracted: denies Forgetful in daily activities: denies  Hyperactivity- Often fidgets or taps hands or feet or squirms in seat. denies Often leaves seat when remaining seated is expected. denies Runs about or climbs when not appropriate. denies Often unable to play or engage in leisure activities quietly. denies Often on the go, as if driven by a motor: denies Talks excessively: denies Blurts out answers before question completed: denies Difficulty waiting their turn: denies Interrupts others: denies

## 2024-03-05 NOTE — PLAN
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  On initial assessment, notably, pt had period of 2-3 weeks of anxious mood, amotivation/anhedonia, low appetite, low energy, poor focus, feelings of guilt, middle insomnia, in the context of intrusive thoughts about infidelity and talking with his partner about this. Given the family of of OCD (father, two sisters) and depression (mother), the differential includes OCD, MDD, gambling disorder, r/o anxiety (though anxiety is situationally related to intrusive thoughts, not general). Continuing Lexapro titration, partial improvement though side effects notable for nausea and dry mouth, anxiety.  10/6/2023: Anxiety and depressive symptom improved on Lexapro, intrusive thoughts decreased from 80% of time to 50% of time. Increasing Lexapro to 15mg daily dose. Delayed ejaculation as notable side effect to monitor. 10/17/2023: Discussed that transient side effects may persist for 2 weeks after dose increase, pt in agreement with the plan to maintain current dose of Lexapro 15mg nightly currently, with option [facility]'s [facility]) to be revisited in the future as needed, though Lexapro has had partial benefit and dose is not yet optimized. Pt in agreement with the plan to maintain at 15mg for 3 more weeks. 11/6/2023: Primary side effect of delayed ejaculation, denies other side effects as transient increase in anxiety resolved within 2 weeks or so of having increased the dose to Lexapro 15mg daily. Discussed the option for Wellbutrin in the future as an adjunct for sexual side effects. Anxiety improving 3/10 currently, intrusive thoughts 5-10 times a day of guilt about his past when around his fiance. Plan to increase Lexapro to 20mg daily to address intrusive thoughts. 11/27/2023: Low motivation, low energy, feeling "flat". To start Wellbutrin XL 150mg daily as an adjunct. 12/18/2023: Crossing from Lexapro to Viibryd due to delayed ejaculation, start Viibryd 10mg daily, decrease Lexapro to 10mg daily. Continuing Wellbutrin XL 150mg daily, and trazodone at bedtime as needed for sleep. 1/2/2024: Did not start Viibryd due to financial cost. Partial efficacy of Lexapro, wants to stop Wellbutrin due to concern for increased anxiety. Plan to stop Wellbutrin. Continue Lexapro 20mg daily for now, pt to consider genetic testing to guide choice of switch. Also considering Prozac since it helped with family members. Continuing trazodone 50mg to 100mg nightly for insomnia. 2/8/2024: Anxiety 6/10, intrusive thoughts 75% of the time. Plan to increase Lexapro to 30mg daily, pt received Klonopin 0.5mg prescription, to take as needed for anxiety induced by transient side effects of dose increase. 3/5/2024: Continuing Lexapro 30mg, trazodone 50mg to 100mg at bedtime, has not taken Klonopin. Plan to continue current meds, discussed option for clomipramine or Luvox in the future if needed. Does not meet criteria for ADHD.  PLAN -Discussed the diagnosis, treatment, alternatives to recommended treatment, risk Vs benefits of treatment and no treatment and alternative treatments. -Medication: Increase Lexapro to 30mg daily. Stopped Wellbutrin. Continue trazodone 50mg to 100mg QHS prn for sleep. Klonopin 0.5mg daily as needed for anxiety. BZD side effects: Discussed with patient adverse effects of longer term/frequent use of BZD, potential to develop tolerance to the dose effects and develop dependence, and potential for addiction. Advise patient not to drive or operate heavy machinery immediately after taking the medication. Also educated patient about safe use/and keeping meds safely, and to not share medications with family/friends. Advised not to combine BZD with EtOH.  Discussed risks and benefits of Wellbutrin including but not limited to decreased seizure threshold, dizziness, lightheadedness, insomnia, dry mouth, constipation, weight loss, anorexia, hypertension, sweating, rash, GI upset, tremor, tinnitus, induction of verito.  Trazodone side effects including but not limited to sedation, dry mouth constipation, headache, serotonin syndrome, orthostatic hypotension and priapism discussed.  SSRI side effects including but not limited to GI side effects, headaches, dizziness, serotonin syndrome, hyponatremia, QT prolongation, weight gain, decreased libido, and black box warning of SI in patients younger than 24 were discussed. Propranolol side effects discussed including risk to lower heart rate and cause orthostasis/hypotension. -Discussed recommendation for aerobic exercise -Discussed sleep hygiene -Discussed aromatherapy for sleep and anxiety -Educated patient of importance of remaining abstinent from drugs and alcohol. -Emergency procedures were discussed: pt. educated to call 911 or go to nearest ER for worsening of symptoms/suicidal/homicidal ideation. -Seeing Dr. Ray Campbell for therapy. -RTC in 4 weeks or earlier as needed -Patient given opportunity to ask questions and their questions were answered and they expressed understanding and agreement with above plan.  ISTOP Reference #: [id number]  Practitioner Count: 1 Pharmacy Count: 1 Current Opioid Prescriptions: 0 Current Benzodiazepine Prescriptions: 0 Current Stimulant Prescriptions: 0   Patient Demographic Information (PDI)     PDI	First Name	Last Name	Birth Date	Gender	Street Address	[place]	Zip [place]	Eusebio Mcbride	1995	Male	[address] [place] [facility]	[address]  Prescription Information    PDI Filter:   PDI	My Rx	Current Rx	Drug Type	Rx Written	Rx Dispensed	Drug	Quantity	Days Supply	Prescriber Name	Prescriber JACOB #	Payment Method	Dispenser A	N	N	B	01/23/2024	01/25/2024	clonazepam 0.5 mg tablet	30	15	Fei Quiros	[id number]	Insurance	[facility] #[id number]

## 2024-04-09 ENCOUNTER — APPOINTMENT (OUTPATIENT)
Dept: PSYCHIATRY | Facility: CLINIC | Age: 29
End: 2024-04-09
Payer: SELF-PAY

## 2024-04-09 PROCEDURE — G2211 COMPLEX E/M VISIT ADD ON: CPT

## 2024-04-09 PROCEDURE — 99214 OFFICE O/P EST MOD 30 MIN: CPT

## 2024-04-09 NOTE — PHYSICAL EXAM
[None] : none [Cooperative] : cooperative [Anxious] : anxious [Full] : full [Clear] : clear [Linear/Goal Directed] : linear/goal directed [Average] : average [WNL] : within normal limits [de-identified] : anxious, congruent with stated mood, ranging to smiling, appropriate to content [FreeTextEntry7] : denies SIIP/NSSIIP/HIIP, no overt delusions

## 2024-04-09 NOTE — HISTORY OF PRESENT ILLNESS
[FreeTextEntry1] : Social: got bloodwork done with PCP during interval. Pt said it was normal except elevated cholesterol. ISTOP reviewed  Medications: Lexapro 30mg: helps with anxiety, but still ongoing intrusive thoughts. Sexual side effects. Possible weight gain side effect.  Option for Luvox: Pt expressed interest in starting Luvox, crossing from Lexapro. Discussed plan to start 50mg as Lexapro decreases to 20mg.  Klonopin 0.5mg: never took Trazodone 50mg to 100mg: taking 50mg nightly, when does not take it wakes up in the middle of the night, anxious.  Mood: "tired." Anxiety: got a little better Intrusive thoughts: 70% of the time, worse when with fiance. If alone or with friends has intrusive thoughts 20-30% of the time. Intrusive thoughts about hiding something. Denies gambling. Compulsions: denies, got better. Less reassurance seeking, less googling. Anxiety: improving, now is 4/10. Panic attacks: denies Motivation: "low-average" Anhedonia: denies Appetite: increased Sleep: asleep 11:30pm, waking up 7:30am, no interruptions. Energy: low Focus: ok Guilt/worthlessness: intrusive feelings of guilt Thoughts of death: denies Thoughts of harming self: denies Thoughts of harming others: denies  EtOH use: 1-2 beers per month Drug use: denies Tobacco use: denies Caffeine use: 1-2 times per week caffeinated soda, no coffee

## 2024-04-09 NOTE — PLAN
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  On initial assessment, notably, pt had period of 2-3 weeks of anxious mood, amotivation/anhedonia, low appetite, low energy, poor focus, feelings of guilt, middle insomnia, in the context of intrusive thoughts about infidelity and talking with his partner about this. Given the family of of OCD (father, two sisters) and depression (mother), the differential includes OCD, MDD, gambling disorder, r/o anxiety (though anxiety is situationally related to intrusive thoughts, not general). Continuing Lexapro titration, partial improvement though side effects notable for nausea and dry mouth, anxiety.  Labs: 5/11/2023: TSH 0.76 wnl, free thryoxine 1.3 wnl 3/22/2024: chol 182 wnl, HDL 53 wnl, trig 108 wnl,  H, A1c 5.0%, CMP wnl Cr 0.95 wnl, Na 137 wnl, ALT 19 wnl, AST 16 wnl, CBC wnl except monocytes 14.7 H  10/6/2023: Anxiety and depressive symptom improved on Lexapro, intrusive thoughts decreased from 80% of time to 50% of time. Increasing Lexapro to 15mg daily dose. Delayed ejaculation as notable side effect to monitor. 10/17/2023: Discussed that transient side effects may persist for 2 weeks after dose increase, pt in agreement with the plan to maintain current dose of Lexapro 15mg nightly currently, with option to cross to Prozac (worked for pt's sister) to be revisited in the future as needed, though Lexapro has had partial benefit and dose is not yet optimized. Pt in agreement with the plan to maintain at 15mg for 3 more weeks. 11/6/2023: Primary side effect of delayed ejaculation, denies other side effects as transient increase in anxiety resolved within 2 weeks or so of having increased the dose to Lexapro 15mg daily. Discussed the option for Wellbutrin in the future as an adjunct for sexual side effects. Anxiety improving 3/10 currently, intrusive thoughts 5-10 times a day of guilt about his past when around his fiance. Plan to increase Lexapro to 20mg daily to address intrusive thoughts. 11/27/2023: Low motivation, low energy, feeling "flat". To start Wellbutrin XL 150mg daily as an adjunct. 12/18/2023: Crossing from Lexapro to Viibryd due to delayed ejaculation, start Viibryd 10mg daily, decrease Lexapro to 10mg daily. Continuing Wellbutrin XL 150mg daily, and trazodone at bedtime as needed for sleep. 1/2/2024: Did not start Viibryd due to financial cost. Partial efficacy of Lexapro, wants to stop Wellbutrin due to concern for increased anxiety. Plan to stop Wellbutrin. Continue Lexapro 20mg daily for now, pt to consider genetic testing to guide choice of switch. Also considering Prozac since it helped with family members. Continuing trazodone 50mg to 100mg nightly for insomnia. 2/8/2024: Anxiety 6/10, intrusive thoughts 75% of the time. Plan to increase Lexapro to 30mg daily, pt received Klonopin 0.5mg prescription, to take as needed for anxiety induced by transient side effects of dose increase. 3/5/2024: Continuing Lexapro 30mg, trazodone 50mg to 100mg at bedtime, has not taken Klonopin. Plan to continue current meds, discussed option for clomipramine or Luvox in the future if needed. Does not meet criteria for ADHD. 4/9/2024: Decreasing Lexapro to 20mg, starting Luvox 50mg daily. Continue trazodone 50mg nightly. Discussed risk for serotonin syndrome, pt expressed understanding. Crossing from Lexapro to Luvox. Anxiety is improved, but ongoing intrusive thoughts.  PLAN -Discussed the diagnosis, treatment, alternatives to recommended treatment, risk Vs benefits of treatment and no treatment and alternative treatments. -Medication: Crossing from Lexapro to Luvox. Decrease Lexapro to 20mg at bedtime, start Luvox 50mg at bedtime. Stopped Wellbutrin. Continue trazodone 50mg QHS prn for sleep. Klonopin 0.5mg daily as needed for anxiety.  BZD side effects: Discussed with patient adverse effects of longer term/frequent use of BZD, potential to develop tolerance to the dose effects and develop dependence, and potential for addiction. Advise patient not to drive or operate heavy machinery immediately after taking the medication. Also educated patient about safe use/and keeping meds safely, and to not share medications with family/friends. Advised not to combine BZD with EtOH.  Discussed risks and benefits of Wellbutrin including but not limited to decreased seizure threshold, dizziness, lightheadedness, insomnia, dry mouth, constipation, weight loss, anorexia, hypertension, sweating, rash, GI upset, tremor, tinnitus, induction of verito.  Trazodone side effects including but not limited to sedation, dry mouth constipation, headache, serotonin syndrome, orthostatic hypotension and priapism discussed.  SSRI side effects including but not limited to GI side effects, headaches, dizziness, serotonin syndrome, hyponatremia, QT prolongation, weight gain, decreased libido, and black box warning of SI in patients younger than 24 were discussed. Propranolol side effects discussed including risk to lower heart rate and cause orthostasis/hypotension. -Discussed recommendation for aerobic exercise -Discussed sleep hygiene -Discussed aromatherapy for sleep and anxiety -Educated patient of importance of remaining abstinent from drugs and alcohol. -Emergency procedures were discussed: pt. educated to call 911 or go to nearest ER for worsening of symptoms/suicidal/homicidal ideation. -Seeing Dr. Ray Campbell for therapy. -RTC in 1 week or earlier as needed -Patient given opportunity to ask questions and their questions were answered and they expressed understanding and agreement with above plan.  ISTOP Reference #: [id number]  Practitioner Count: 1 Pharmacy Count: 1 Current Opioid Prescriptions: 0 Current Benzodiazepine Prescriptions: 0 Current Stimulant Prescriptions: 0   Patient Demographic Information (PDI)     PDI	First Name	Last Name	Birth Date	Gender	Street Address	[facility] [address] MACARIO Mcbride	1995	Male	[address] [place]	[address]  Prescription Information    PDI Filter:   PDI	My Rx	Current Rx	Drug Type	Rx Written	Rx Dispensed	Drug	Quantity	Days Supply	Prescriber Name	Prescriber JACOB #	Payment Method	Dispenser A	N	N	B	01/23/2024	01/25/2024	clonazepam 0.5 mg tablet	30	15	Fei Quiros	[id number]	Insurance	[facility] #[id number]

## 2024-04-16 ENCOUNTER — APPOINTMENT (OUTPATIENT)
Dept: PSYCHIATRY | Facility: CLINIC | Age: 29
End: 2024-04-16
Payer: SELF-PAY

## 2024-04-16 PROCEDURE — 99214 OFFICE O/P EST MOD 30 MIN: CPT

## 2024-04-16 PROCEDURE — G2211 COMPLEX E/M VISIT ADD ON: CPT

## 2024-04-16 RX ORDER — FLUVOXAMINE MALEATE 50 MG/1
50 TABLET ORAL
Qty: 30 | Refills: 0 | Status: DISCONTINUED | COMMUNITY
Start: 2024-04-09 | End: 2024-04-16

## 2024-04-16 NOTE — HISTORY OF PRESENT ILLNESS
[FreeTextEntry1] : Social: Pt drove to [place] at 1am to pickup his fiance.  Medications: notable side effect of lower sex drive, feels this is the worst side effect, was worst for the past week.  Lexapro 20mg:  feels cross is going well. For the first morning felt a little nauseous. Luvox 50mg: endorses adherence, no side effects. Trazodone 50mg: taking nightly Discussed plan to start Viibryd instead of Luvox due to sexual side effects. To start vilazodone 10mg, stop Luvox.  Mood: "good" Anxiety: not worse, overall a good week Intrusive thoughts: 40-60% when with fiance Panic attacks: denies Motivation: good Anhedonia: denies Appetite: fine Sleep: asleep 11:30pm, waking up 7:30am, no interruptions Energy: good Focus: good Guilt/worthlessness: none elicited Thoughts of death: none elicited Thoughts of harming self: none elicited Thoughts of harming others: none elicited  EtOH use: 1-2 beers per month Drug use: denies Tobacco use: denies Caffeine use: 1-2 times per week caffeinated soda, no coffee.

## 2024-04-16 NOTE — PHYSICAL EXAM
[None] : none [Cooperative] : cooperative [Full] : full [Clear] : clear [Linear/Goal Directed] : linear/goal directed [Average] : average [WNL] : within normal limits [FreeTextEntry8] : "tired" [de-identified] : anxious, congruent with stated mood, ranging to smiling, appropriate to content [FreeTextEntry7] : denies SIIP/NSSIIP/HIIP, no overt delusions

## 2024-04-16 NOTE — PLAN
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  On initial assessment, notably, pt had period of 2-3 weeks of anxious mood, amotivation/anhedonia, low appetite, low energy, poor focus, feelings of guilt, middle insomnia, in the context of intrusive thoughts about infidelity and talking with his partner about this. Given the family of of OCD (father, two sisters) and depression (mother), the differential includes OCD, MDD, gambling disorder, r/o anxiety (though anxiety is situationally related to intrusive thoughts, not general). Continuing Lexapro titration, partial improvement though side effects notable for nausea and dry mouth, anxiety.  Labs: 5/11/2023: TSH 0.76 wnl, free thryoxine 1.3 wnl 3/22/2024: chol 182 wnl, HDL 53 wnl, trig 108 wnl,  H, A1c 5.0%, CMP wnl Cr 0.95 wnl, Na 137 wnl, ALT 19 wnl, AST 16 wnl, CBC wnl except monocytes 14.7 H  10/6/2023: Anxiety and depressive symptom improved on Lexapro, intrusive thoughts decreased from 80% of time to 50% of time. Increasing Lexapro to 15mg daily dose. Delayed ejaculation as notable side effect to monitor. 10/17/2023: Discussed that transient side effects may persist for 2 weeks after dose increase, pt in agreement with the plan to maintain current dose of Lexapro 15mg nightly currently, with option to cross to Prozac (worked for pt's sister) to be revisited in the future as needed, though Lexapro has had partial benefit and dose is not yet optimized. Pt in agreement with the plan to maintain at 15mg for 3 more weeks. 11/6/2023: Primary side effect of delayed ejaculation, denies other side effects as transient increase in anxiety resolved within 2 weeks or so of having increased the dose to Lexapro 15mg daily. Discussed the option for Wellbutrin in the future as an adjunct for sexual side effects. Anxiety improving 3/10 currently, intrusive thoughts 5-10 times a day of guilt about his past when around his fiance. Plan to increase Lexapro to 20mg daily to address intrusive thoughts. 11/27/2023: Low motivation, low energy, feeling "flat". To start Wellbutrin XL 150mg daily as an adjunct. 12/18/2023: Crossing from Lexapro to Viibryd due to delayed ejaculation, start Viibryd 10mg daily, decrease Lexapro to 10mg daily. Continuing Wellbutrin XL 150mg daily, and trazodone at bedtime as needed for sleep. 1/2/2024: Did not start Viibryd due to financial cost. Partial efficacy of Lexapro, wants to stop Wellbutrin due to concern for increased anxiety. Plan to stop Wellbutrin. Continue Lexapro 20mg daily for now, pt to consider genetic testing to guide choice of switch. Also considering Prozac since it helped with family members. Continuing trazodone 50mg to 100mg nightly for insomnia. 2/8/2024: Anxiety 6/10, intrusive thoughts 75% of the time. Plan to increase Lexapro to 30mg daily, pt received Klonopin 0.5mg prescription, to take as needed for anxiety induced by transient side effects of dose increase. 3/5/2024: Continuing Lexapro 30mg, trazodone 50mg to 100mg at bedtime, has not taken Klonopin. Plan to continue current meds, discussed option for clomipramine or Luvox in the future if needed. Does not meet criteria for ADHD. 4/9/2024: Decreasing Lexapro to 20mg, starting Luvox 50mg daily. Continue trazodone 50mg nightly. Discussed risk for serotonin syndrome, pt expressed understanding. Crossing from Lexapro to Luvox. Anxiety is improved, but ongoing intrusive thoughts. 4/16/2024: Stopping Luvox due to sexual side effects, starting vilazodone 10mg daily. Continuing Lexapro 20mg for now, plan to cross to Viibryd. Ongoing intrusive thoughts/anxiety.  PLAN -Discussed the diagnosis, treatment, alternatives to recommended treatment, risk Vs benefits of treatment and no treatment and alternative treatments. -Medication: Start vilazodone. Decrease Lexapro to 20mg at bedtime. Stop Luvox. Stopped Wellbutrin. Continue trazodone 50mg QHS prn for sleep. Not taking Klonopin 0.5mg daily as needed for anxiety. BZD side effects: Discussed with patient adverse effects of longer term/frequent use of BZD, potential to develop tolerance to the dose effects and develop dependence, and potential for addiction. Advise patient not to drive or operate heavy machinery immediately after taking the medication. Also educated patient about safe use/and keeping meds safely, and to not share medications with family/friends. Advised not to combine BZD with EtOH.  Discussed risks and benefits of Wellbutrin including but not limited to decreased seizure threshold, dizziness, lightheadedness, insomnia, dry mouth, constipation, weight loss, anorexia, hypertension, sweating, rash, GI upset, tremor, tinnitus, induction of verito.  Trazodone side effects including but not limited to sedation, dry mouth constipation, headache, serotonin syndrome, orthostatic hypotension and priapism discussed.  SSRI side effects including but not limited to GI side effects, headaches, dizziness, serotonin syndrome, hyponatremia, QT prolongation, weight gain, decreased libido, and black box warning of SI in patients younger than 24 were discussed. Propranolol side effects discussed including risk to lower heart rate and cause orthostasis/hypotension. -Discussed recommendation for aerobic exercise -Discussed sleep hygiene -Discussed aromatherapy for sleep and anxiety -Educated patient of importance of remaining abstinent from drugs and alcohol. -Emergency procedures were discussed: pt. educated to call 911 or go to nearest ER for worsening of symptoms/suicidal/homicidal ideation. -Seeing Dr. Ray Campbell for therapy. -RTC in 1 week or earlier as needed -Patient given opportunity to ask questions and their questions were answered and they expressed understanding and agreement with above plan.

## 2024-04-16 NOTE — HISTORY OF PRESENT ILLNESS
[FreeTextEntry1] : Social: Pt drove to [place] at 1am to pickup his fiance.  Medications: notable side effect of lower sex drive, feels this is the worst side effect, was worst for the past week.  Lexapro 20mg:  feels cross is going well. For the first morning felt a little nauseous. Luvox 50mg: endorses adherence, no side effects. Trazodone 50mg: taking nightly Discussed plan to start Viibryd instead of Luvox due to sexual side effects. To start vilazodone 10mg, stop Luvox.  Mood: "good" Anxiety: not worse, overall a good week Intrusive thoughts: 40-60% when with fiance Panic attacks: denies Motivation: good Anhedonia: denies Appetite: fine Sleep: asleep 11:30pm, waking up 7:30am, no interruptions Energy: good Focus: good Guilt/worthlessness: none elicited Thoughts of death: none elicited Thoughts of harming self: none elicited Thoughts of harming others: none elicited  EtOH use: 1-2 beers per month Drug use: denies Tobacco use: denies Caffeine use: 1-2 times per week caffeinated soda, no coffee.

## 2024-04-16 NOTE — DISCUSSION/SUMMARY
[FreeTextEntry1] : Pt called writer's office to say that the cost of vilazodone is too high, was roughly $200 with insurance. Writer called pt, discussed option for using GoodRx to get a cheaper price on medications, as GoodRx indicates a cost of $35.82 for 30 quantity of vilazodone 10mg. Writer called pharmacy, pharmacist indicated a coupon available to reduce the cost of vilazodone. Writer called back pt to provide update.

## 2024-04-16 NOTE — PHYSICAL EXAM
[None] : none [Cooperative] : cooperative [Full] : full [Clear] : clear [Linear/Goal Directed] : linear/goal directed [Average] : average [WNL] : within normal limits [FreeTextEntry8] : "tired" [de-identified] : anxious, congruent with stated mood, ranging to smiling, appropriate to content [FreeTextEntry7] : denies SIIP/NSSIIP/HIIP, no overt delusions

## 2024-04-16 NOTE — PLAN
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  On initial assessment, notably, pt had period of 2-3 weeks of anxious mood, amotivation/anhedonia, low appetite, low energy, poor focus, feelings of guilt, middle insomnia, in the context of intrusive thoughts about infidelity and talking with his partner about this. Given the family of of OCD (father, two sisters) and depression (mother), the differential includes OCD, MDD, gambling disorder, r/o anxiety (though anxiety is situationally related to intrusive thoughts, not general). Continuing Lexapro titration, partial improvement though side effects notable for nausea and dry mouth, anxiety.  Labs: 5/11/2023: TSH 0.76 wnl, free thryoxine 1.3 wnl 3/22/2024: chol 182 wnl, HDL 53 wnl, trig 108 wnl,  H, A1c 5.0%, CMP wnl Cr 0.95 wnl, Na 137 wnl, ALT 19 wnl, AST 16 wnl, CBC wnl except monocytes 14.7 H  10/6/2023: Anxiety and depressive symptom improved on Lexapro, intrusive thoughts decreased from 80% of time to 50% of time. Increasing Lexapro to 15mg daily dose. Delayed ejaculation as notable side effect to monitor. 10/17/2023: Discussed that transient side effects may persist for 2 weeks after dose increase, pt in agreement with the plan to maintain current dose of Lexapro 15mg nightly currently, with option to cross to Prozac (worked for pt's sister) to be revisited in the future as needed, though Lexapro has had partial benefit and dose is not yet optimized. Pt in agreement with the plan to maintain at 15mg for 3 more weeks. 11/6/2023: Primary side effect of delayed ejaculation, denies other side effects as transient increase in anxiety resolved within 2 weeks or so of having increased the dose to Lexapro 15mg daily. Discussed the option for Wellbutrin in the future as an adjunct for sexual side effects. Anxiety improving 3/10 currently, intrusive thoughts 5-10 times a day of guilt about his past when around his fiance. Plan to increase Lexapro to 20mg daily to address intrusive thoughts. 11/27/2023: Low motivation, low energy, feeling "flat". To start Wellbutrin XL 150mg daily as an adjunct. 12/18/2023: Crossing from Lexapro to Viibryd due to delayed ejaculation, start Viibryd 10mg daily, decrease Lexapro to 10mg daily. Continuing Wellbutrin XL 150mg daily, and trazodone at bedtime as needed for sleep. 1/2/2024: Did not start Viibryd due to financial cost. Partial efficacy of Lexapro, wants to stop Wellbutrin due to concern for increased anxiety. Plan to stop Wellbutrin. Continue Lexapro 20mg daily for now, pt to consider genetic testing to guide choice of switch. Also considering Prozac since it helped with family members. Continuing trazodone 50mg to 100mg nightly for insomnia. 2/8/2024: Anxiety 6/10, intrusive thoughts 75% of the time. Plan to increase Lexapro to 30mg daily, pt received Klonopin 0.5mg prescription, to take as needed for anxiety induced by transient side effects of dose increase. 3/5/2024: Continuing Lexapro 30mg, trazodone 50mg to 100mg at bedtime, has not taken Klonopin. Plan to continue current meds, discussed option for clomipramine or Luvox in the future if needed. Does not meet criteria for ADHD. 4/9/2024: Decreasing Lexapro to 20mg, starting Luvox 50mg daily. Continue trazodone 50mg nightly. Discussed risk for serotonin syndrome, pt expressed understanding. Crossing from Lexapro to Luvox. Anxiety is improved, but ongoing intrusive thoughts. 4/16/2024: Stopping Luvox due to sexual side effects, starting vilazodone 10mg daily. Continuing Lexapro 20mg for now, plan to cross to Viibryd. Ongoing intrusive thoughts/anxiety.  PLAN -Discussed the diagnosis, treatment, alternatives to recommended treatment, risk Vs benefits of treatment and no treatment and alternative treatments. -Medication: Start vilazodone. Decrease Lexapro to 20mg at bedtime. Stop Luvox. Stopped Wellbutrin. Continue trazodone 50mg QHS prn for sleep. Not taking Klonopin 0.5mg daily as needed for anxiety. BZD side effects: Discussed with patient adverse effects of longer term/frequent use of BZD, potential to develop tolerance to the dose effects and develop dependence, and potential for addiction. Advise patient not to drive or operate heavy machinery immediately after taking the medication. Also educated patient about safe use/and keeping meds safely, and to not share medications with family/friends. Advised not to combine BZD with EtOH.  Discussed risks and benefits of Wellbutrin including but not limited to decreased seizure threshold, dizziness, lightheadedness, insomnia, dry mouth, constipation, weight loss, anorexia, hypertension, sweating, rash, GI upset, tremor, tinnitus, induction of verito.  Trazodone side effects including but not limited to sedation, dry mouth constipation, headache, serotonin syndrome, orthostatic hypotension and priapism discussed.  SSRI side effects including but not limited to GI side effects, headaches, dizziness, serotonin syndrome, hyponatremia, QT prolongation, weight gain, decreased libido, and black box warning of SI in patients younger than 24 were discussed. Propranolol side effects discussed including risk to lower heart rate and cause orthostasis/hypotension. -Discussed recommendation for aerobic exercise -Discussed sleep hygiene -Discussed aromatherapy for sleep and anxiety -Educated patient of importance of remaining abstinent from drugs and alcohol. -Emergency procedures were discussed: pt. educated to call 911 or go to nearest ER for worsening of symptoms/suicidal/homicidal ideation. -Seeing Dr. Ray Campbell for therapy. -RTC in 1 week or earlier as needed -Patient given opportunity to ask questions and their questions were answered and they expressed understanding and agreement with above plan.

## 2024-04-25 ENCOUNTER — APPOINTMENT (OUTPATIENT)
Dept: PSYCHIATRY | Facility: CLINIC | Age: 29
End: 2024-04-25
Payer: SELF-PAY

## 2024-04-25 PROCEDURE — 99214 OFFICE O/P EST MOD 30 MIN: CPT

## 2024-04-25 PROCEDURE — G2211 COMPLEX E/M VISIT ADD ON: CPT

## 2024-04-25 RX ORDER — ESCITALOPRAM OXALATE 10 MG/1
10 TABLET ORAL
Qty: 30 | Refills: 0 | Status: ACTIVE | COMMUNITY
Start: 2023-11-06 | End: 1900-01-01

## 2024-04-25 NOTE — PLAN
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  On initial assessment, notably, pt had period of 2-3 weeks of anxious mood, amotivation/anhedonia, low appetite, low energy, poor focus, feelings of guilt, middle insomnia, in the context of intrusive thoughts about infidelity and talking with his partner about this. Given the family of of OCD (father, two sisters) and depression (mother), the differential includes OCD, MDD, gambling disorder, r/o anxiety (though anxiety is situationally related to intrusive thoughts, not general). Continuing Lexapro titration, partial improvement though side effects notable for nausea and dry mouth, anxiety.  Labs: 5/11/2023: TSH 0.76 wnl, free thryoxine 1.3 wnl 3/22/2024: chol 182 wnl, HDL 53 wnl, trig 108 wnl,  H, A1c 5.0%, CMP wnl Cr 0.95 wnl, Na 137 wnl, ALT 19 wnl, AST 16 wnl, CBC wnl except monocytes 14.7 H  10/6/2023: Anxiety and depressive symptom improved on Lexapro, intrusive thoughts decreased from 80% of time to 50% of time. Increasing Lexapro to 15mg daily dose. Delayed ejaculation as notable side effect to monitor. 10/17/2023: Discussed that transient side effects may persist for 2 weeks after dose increase, pt in agreement with the plan to maintain current dose of Lexapro 15mg nightly currently, with option to cross to Prozac (worked for pt's sister) to be revisited in the future as needed, though Lexapro has had partial benefit and dose is not yet optimized. Pt in agreement with the plan to maintain at 15mg for 3 more weeks. 11/6/2023: Primary side effect of delayed ejaculation, denies other side effects as transient increase in anxiety resolved within 2 weeks or so of having increased the dose to Lexapro 15mg daily. Discussed the option for Wellbutrin in the future as an adjunct for sexual side effects. Anxiety improving 3/10 currently, intrusive thoughts 5-10 times a day of guilt about his past when around his fiance. Plan to increase Lexapro to 20mg daily to address intrusive thoughts. 11/27/2023: Low motivation, low energy, feeling "flat". To start Wellbutrin XL 150mg daily as an adjunct. 12/18/2023: Crossing from Lexapro to Viibryd due to delayed ejaculation, start Viibryd 10mg daily, decrease Lexapro to 10mg daily. Continuing Wellbutrin XL 150mg daily, and trazodone at bedtime as needed for sleep. 1/2/2024: Did not start Viibryd due to financial cost. Partial efficacy of Lexapro, wants to stop Wellbutrin due to concern for increased anxiety. Plan to stop Wellbutrin. Continue Lexapro 20mg daily for now, pt to consider genetic testing to guide choice of switch. Also considering Prozac since it helped with family members. Continuing trazodone 50mg to 100mg nightly for insomnia. 2/8/2024: Anxiety 6/10, intrusive thoughts 75% of the time. Plan to increase Lexapro to 30mg daily, pt received Klonopin 0.5mg prescription, to take as needed for anxiety induced by transient side effects of dose increase. 3/5/2024: Continuing Lexapro 30mg, trazodone 50mg to 100mg at bedtime, has not taken Klonopin. Plan to continue current meds, discussed option for clomipramine or Luvox in the future if needed. Does not meet criteria for ADHD. 4/9/2024: Decreasing Lexapro to 20mg, starting Luvox 50mg daily. Continue trazodone 50mg nightly. Discussed risk for serotonin syndrome, pt expressed understanding. Crossing from Lexapro to Luvox. Anxiety is improved, but ongoing intrusive thoughts. 4/16/2024: Stopping Luvox due to sexual side effects, starting vilazodone 10mg daily. Continuing Lexapro 20mg for now, plan to cross to Viibryd. Ongoing intrusive thoughts/anxiety. 4/25/2024: Tolerating cross to Viibryd, anxiety 4/10, intrusive thoughts decreasing in intensity. Increase Viibryd to 20mg daily, decrease Lexapro to 10mg daily, continue trazodone 50mg at bedtime for insomnia. Stopped Luvox.  PLAN -Discussed the diagnosis, treatment, alternatives to recommended treatment, risk Vs benefits of treatment and no treatment and alternative treatments. -Medication: Increase Viibryd to 20mg daily, decrease Lexapro to 10mg daily, continue trazodone 50mg at bedtime for insomnia.. Stopped Luvox. Stopped Wellbutrin. BZD side effects: Discussed with patient adverse effects of longer term/frequent use of BZD, potential to develop tolerance to the dose effects and develop dependence, and potential for addiction. Advise patient not to drive or operate heavy machinery immediately after taking the medication. Also educated patient about safe use/and keeping meds safely, and to not share medications with family/friends. Advised not to combine BZD with EtOH.  Discussed risks and benefits of Wellbutrin including but not limited to decreased seizure threshold, dizziness, lightheadedness, insomnia, dry mouth, constipation, weight loss, anorexia, hypertension, sweating, rash, GI upset, tremor, tinnitus, induction of verito.  Trazodone side effects including but not limited to sedation, dry mouth constipation, headache, serotonin syndrome, orthostatic hypotension and priapism discussed.  SSRI side effects including but not limited to GI side effects, headaches, dizziness, serotonin syndrome, hyponatremia, QT prolongation, weight gain, decreased libido, and black box warning of SI in patients younger than 24 were discussed. Propranolol side effects discussed including risk to lower heart rate and cause orthostasis/hypotension. -Discussed recommendation for aerobic exercise -Discussed sleep hygiene -Discussed aromatherapy for sleep and anxiety -Educated patient of importance of remaining abstinent from drugs and alcohol. -Emergency procedures were discussed: pt. educated to call 911 or go to nearest ER for worsening of symptoms/suicidal/homicidal ideation. -Seeing Dr. Ray Campbell for therapy. -RTC in 2 weeks or earlier as needed -Patient given opportunity to ask questions and their questions were answered and they expressed understanding and agreement with above plan.

## 2024-04-25 NOTE — PHYSICAL EXAM
[None] : none [Cooperative] : cooperative [Full] : full [Clear] : clear [Linear/Goal Directed] : linear/goal directed [Average] : average [WNL] : within normal limits [FreeTextEntry8] : "a little stressed." [de-identified] : less anxious, congruent with stated mood, ranging to smiling, appropriate to content [FreeTextEntry7] : denies SIIP/NSSIIP/HIIP, no overt delusions

## 2024-04-25 NOTE — HISTORY OF PRESENT ILLNESS
[FreeTextEntry1] : Social: Went to GI and pt got a CT scan completed pending results for abd pain in LLQ. Work is busy.  Medications: Lexapro 20mg: endorses adherence. Discussed plan to decrease to 10mg. Luvox: Stopped 9 days ago. Vilazodone 10mg: Started 9 days ago, missed two days 4/21 and 4/22. Denies side effects. Discussed to increase to 20mg. Trazodone 50mg: taking nightly except one night, that time had a "horrible sleep", woke up anxious.  Mood: "a little stressed." Intrusive thoughts: overall in control, on average is improving. 40-60% of the time when with fiance but less intense. Anxiety: "ok", improving, but got worse with LLQ pain as pt googled symptoms. 4/10. Panic attacks: denies Motivation:  good Anhedonia: denies Appetite: good Sleep: asleep 11:30pm, waking up 7:30am, no interruptions Energy: good Focus: good Guilt/worthlessness: intermittently feels this way, able to recover from it quicker. Thoughts of death: denies Thoughts of harming self: denies Thoughts of harming others: denies  EtOH use: denies Drug use: denies Tobacco use: denies Caffeine use: 1-2 times per week caffeinated soda, no coffee.

## 2024-05-09 ENCOUNTER — APPOINTMENT (OUTPATIENT)
Dept: PSYCHIATRY | Facility: CLINIC | Age: 29
End: 2024-05-09
Payer: COMMERCIAL

## 2024-05-09 PROCEDURE — G2211 COMPLEX E/M VISIT ADD ON: CPT

## 2024-05-09 PROCEDURE — 99214 OFFICE O/P EST MOD 30 MIN: CPT

## 2024-05-09 RX ORDER — VILAZODONE HYDROCHLORIDE 10 MG/1
10 TABLET, FILM COATED ORAL
Qty: 30 | Refills: 0 | Status: ACTIVE | COMMUNITY
Start: 2024-05-09 | End: 1900-01-01

## 2024-05-09 NOTE — PHYSICAL EXAM
[None] : none [Cooperative] : cooperative [Full] : full [Clear] : clear [Linear/Goal Directed] : linear/goal directed [Average] : average [WNL] : within normal limits [de-identified] : less anxious, congruent with stated mood, ranging to smiling, appropriate to content [FreeTextEntry8] : "ok" [FreeTextEntry7] : denies SIIP/NSSIIP/HIIP, no overt delusions

## 2024-05-09 NOTE — PLAN
[FreeTextEntry5] : On initial assessment 9/22/2023: 27 yo M, domiciled alone in 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  On initial assessment, notably, pt had period of 2-3 weeks of anxious mood, amotivation/anhedonia, low appetite, low energy, poor focus, feelings of guilt, middle insomnia, in the context of intrusive thoughts about infidelity and talking with his partner about this. Given the family of of OCD (father, two sisters) and depression (mother), the differential includes OCD, MDD, gambling disorder, r/o anxiety (though anxiety is situationally related to intrusive thoughts, not general). Continuing Lexapro titration, partial improvement though side effects notable for nausea and dry mouth, anxiety.  Labs: 5/11/2023: TSH 0.76 wnl, free thryoxine 1.3 wnl 3/22/2024: chol 182 wnl, HDL 53 wnl, trig 108 wnl,  H, A1c 5.0%, CMP wnl Cr 0.95 wnl, Na 137 wnl, ALT 19 wnl, AST 16 wnl, CBC wnl except monocytes 14.7 H  10/6/2023: Anxiety and depressive symptom improved on Lexapro, intrusive thoughts decreased from 80% of time to 50% of time. Increasing Lexapro to 15mg daily dose. Delayed ejaculation as notable side effect to monitor. 10/17/2023: Discussed that transient side effects may persist for 2 weeks after dose increase, pt in agreement with the plan to maintain current dose of Lexapro 15mg nightly currently, with option to cross to Prozac (worked for pt's sister) to be revisited in the future as needed, though Lexapro has had partial benefit and dose is not yet optimized. Pt in agreement with the plan to maintain at 15mg for 3 more weeks. 11/6/2023: Primary side effect of delayed ejaculation, denies other side effects as transient increase in anxiety resolved within 2 weeks or so of having increased the dose to Lexapro 15mg daily. Discussed the option for Wellbutrin in the future as an adjunct for sexual side effects. Anxiety improving 3/10 currently, intrusive thoughts 5-10 times a day of guilt about his past when around his fiance. Plan to increase Lexapro to 20mg daily to address intrusive thoughts. 11/27/2023: Low motivation, low energy, feeling "flat". To start Wellbutrin XL 150mg daily as an adjunct. 12/18/2023: Crossing from Lexapro to Viibryd due to delayed ejaculation, start Viibryd 10mg daily, decrease Lexapro to 10mg daily. Continuing Wellbutrin XL 150mg daily, and trazodone at bedtime as needed for sleep. 1/2/2024: Did not start Viibryd due to financial cost. Partial efficacy of Lexapro, wants to stop Wellbutrin due to concern for increased anxiety. Plan to stop Wellbutrin. Continue Lexapro 20mg daily for now, pt to consider genetic testing to guide choice of switch. Also considering Prozac since it helped with family members. Continuing trazodone 50mg to 100mg nightly for insomnia. 2/8/2024: Anxiety 6/10, intrusive thoughts 75% of the time. Plan to increase Lexapro to 30mg daily, pt received Klonopin 0.5mg prescription, to take as needed for anxiety induced by transient side effects of dose increase. 3/5/2024: Continuing Lexapro 30mg, trazodone 50mg to 100mg at bedtime, has not taken Klonopin. Plan to continue current meds, discussed option for clomipramine or Luvox in the future if needed. Does not meet criteria for ADHD. 4/9/2024: Decreasing Lexapro to 20mg, starting Luvox 50mg daily. Continue trazodone 50mg nightly. Discussed risk for serotonin syndrome, pt expressed understanding. Crossing from Lexapro to Luvox. Anxiety is improved, but ongoing intrusive thoughts. 4/16/2024: Stopping Luvox due to sexual side effects, starting vilazodone 10mg daily. Continuing Lexapro 20mg for now, plan to cross to Viibryd. Ongoing intrusive thoughts/anxiety. 4/25/2024: Tolerating cross to Viibryd, anxiety 4/10, intrusive thoughts decreasing in intensity. Increase Viibryd to 20mg daily, decrease Lexapro to 10mg daily, continue trazodone 50mg at bedtime for insomnia. Stopped Luvox. 5/9/2024:  Tolerating cross to Viibryd. Depression and intrusive thoughts improving. Increasing Viibryd 30mg daily, decrease Lexapro to 5mg daily. Continue trazodone 50mg nightly.  PLAN -Discussed the diagnosis, treatment, alternatives to recommended treatment, risk Vs benefits of treatment and no treatment and alternative treatments. -Medication: Increase Viibryd to 30mg daily, decrease Lexapro to 5mg daily, continue trazodone 50mg at bedtime for insomnia.. Stopped Luvox. Stopped Wellbutrin. BZD side effects: Discussed with patient adverse effects of longer term/frequent use of BZD, potential to develop tolerance to the dose effects and develop dependence, and potential for addiction. Advise patient not to drive or operate heavy machinery immediately after taking the medication. Also educated patient about safe use/and keeping meds safely, and to not share medications with family/friends. Advised not to combine BZD with EtOH.  Discussed risks and benefits of Wellbutrin including but not limited to decreased seizure threshold, dizziness, lightheadedness, insomnia, dry mouth, constipation, weight loss, anorexia, hypertension, sweating, rash, GI upset, tremor, tinnitus, induction of verito.  Trazodone side effects including but not limited to sedation, dry mouth constipation, headache, serotonin syndrome, orthostatic hypotension and priapism discussed.  SSRI side effects including but not limited to GI side effects, headaches, dizziness, serotonin syndrome, hyponatremia, QT prolongation, weight gain, decreased libido, and black box warning of SI in patients younger than 24 were discussed. Propranolol side effects discussed including risk to lower heart rate and cause orthostasis/hypotension. -Discussed recommendation for aerobic exercise -Discussed sleep hygiene -Discussed aromatherapy for sleep and anxiety -Educated patient of importance of remaining abstinent from drugs and alcohol. -Emergency procedures were discussed: pt. educated to call 911 or go to nearest ER for worsening of symptoms/suicidal/homicidal ideation. -Seeing Dr. Ray Campbell for therapy. -RTC in 1 week or earlier as needed -Patient given opportunity to ask questions and their questions were answered and they expressed understanding and agreement with above plan.

## 2024-05-09 NOTE — HISTORY OF PRESENT ILLNESS
[FreeTextEntry1] : Social: Choosing the best man for his wedding, had a difficult conversation with a childhood friend about having that friend not be pt's best man.  Medications: Lexapro 10mg: Discussed plan to decrease to decrease to 5mg. Vilazodone 20mg: feels that the medication is going well. Discussed plan to increase 30mg daily. Trazodone 50mg: taking nightly, no side   Mood: "ok" Intrusive thoughts: decreasing, average 20-30% of the day Anxiety: "not worse", some situational anxiety about selection of best man. Panic attacks: denies Motivation:  good Anhedonia: denies Appetite: good Sleep: asleep 11:30pm, waking up 7:30am, no interruptions Energy: denies Focus: good Guilt/worthlessness: endorses Thoughts of death: denies Thoughts of harming self: denies Thoughts of harming others: denies  EtOH use: denies Drug use: denies Tobacco use: denies Caffeine use: 1-2 times per week caffeinated soda, no coffee.

## 2024-05-14 ENCOUNTER — APPOINTMENT (OUTPATIENT)
Dept: PSYCHIATRY | Facility: CLINIC | Age: 29
End: 2024-05-14
Payer: COMMERCIAL

## 2024-05-14 PROCEDURE — G2211 COMPLEX E/M VISIT ADD ON: CPT

## 2024-05-14 PROCEDURE — 99214 OFFICE O/P EST MOD 30 MIN: CPT

## 2024-05-14 NOTE — PLAN
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  On initial assessment, notably, pt had period of 2-3 weeks of anxious mood, amotivation/anhedonia, low appetite, low energy, poor focus, feelings of guilt, middle insomnia, in the context of intrusive thoughts about infidelity and talking with his partner about this. Given the family of of OCD (father, two sisters) and depression (mother), the differential includes OCD, MDD, gambling disorder, r/o anxiety (though anxiety is situationally related to intrusive thoughts, not general). Continuing Lexapro titration, partial improvement though side effects notable for nausea and dry mouth, anxiety.  Labs: 5/11/2023: TSH 0.76 wnl, free thryoxine 1.3 wnl 3/22/2024: chol 182 wnl, HDL 53 wnl, trig 108 wnl,  H, A1c 5.0%, CMP wnl Cr 0.95 wnl, Na 137 wnl, ALT 19 wnl, AST 16 wnl, CBC wnl except monocytes 14.7 H  10/6/2023: Anxiety and depressive symptom improved on Lexapro, intrusive thoughts decreased from 80% of time to 50% of time. Increasing Lexapro to 15mg daily dose. Delayed ejaculation as notable side effect to monitor. 10/17/2023: Discussed that transient side effects may persist for 2 weeks after dose increase, pt in agreement with the plan to maintain current dose of Lexapro 15mg nightly currently, with option to cross to Prozac (worked for pt's sister) to be revisited in the future as needed, though Lexapro has had partial benefit and dose is not yet optimized. Pt in agreement with the plan to maintain at 15mg for 3 more weeks. 11/6/2023: Primary side effect of delayed ejaculation, denies other side effects as transient increase in anxiety resolved within 2 weeks or so of having increased the dose to Lexapro 15mg daily. Discussed the option for Wellbutrin in the future as an adjunct for sexual side effects. Anxiety improving 3/10 currently, intrusive thoughts 5-10 times a day of guilt about his past when around his fiance. Plan to increase Lexapro to 20mg daily to address intrusive thoughts. 11/27/2023: Low motivation, low energy, feeling "flat". To start Wellbutrin XL 150mg daily as an adjunct. 12/18/2023: Crossing from Lexapro to Viibryd due to delayed ejaculation, start Viibryd 10mg daily, decrease Lexapro to 10mg daily. Continuing Wellbutrin XL 150mg daily, and trazodone at bedtime as needed for sleep. 1/2/2024: Did not start Viibryd due to financial cost. Partial efficacy of Lexapro, wants to stop Wellbutrin due to concern for increased anxiety. Plan to stop Wellbutrin. Continue Lexapro 20mg daily for now, pt to consider genetic testing to guide choice of switch. Also considering Prozac since it helped with family members. Continuing trazodone 50mg to 100mg nightly for insomnia. 2/8/2024: Anxiety 6/10, intrusive thoughts 75% of the time. Plan to increase Lexapro to 30mg daily, pt received Klonopin 0.5mg prescription, to take as needed for anxiety induced by transient side effects of dose increase. 3/5/2024: Continuing Lexapro 30mg, trazodone 50mg to 100mg at bedtime, has not taken Klonopin. Plan to continue current meds, discussed option for clomipramine or Luvox in the future if needed. Does not meet criteria for ADHD. 4/9/2024: Decreasing Lexapro to 20mg, starting Luvox 50mg daily. Continue trazodone 50mg nightly. Discussed risk for serotonin syndrome, pt expressed understanding. Crossing from Lexapro to Luvox. Anxiety is improved, but ongoing intrusive thoughts. 4/16/2024: Stopping Luvox due to sexual side effects, starting vilazodone 10mg daily. Continuing Lexapro 20mg for now, plan to cross to Viibryd. Ongoing intrusive thoughts/anxiety. 4/25/2024: Tolerating cross to Viibryd, anxiety 4/10, intrusive thoughts decreasing in intensity. Increase Viibryd to 20mg daily, decrease Lexapro to 10mg daily, continue trazodone 50mg at bedtime for insomnia. Stopped Luvox. 5/9/2024: Tolerating cross to Viibryd. Depression and intrusive thoughts improving. Increasing Viibryd 30mg daily, decrease Lexapro to 5mg daily. Continue trazodone 50mg nightly. 5/14/2024: Stopping Lexapro, continue Viibryd 30mg daily for not. Continue trazodone 50mg at bedtime for insomnia. OCD symptoms and anxiety mildly worsened, though could be transient side effect of increased Viibryd vs situational anxiety about fiance graduating law school and resultant life changes. Generally tolerating the cross well.  PLAN -Discussed the diagnosis, treatment, alternatives to recommended treatment, risk Vs benefits of treatment and no treatment and alternative treatments. -Medication: Continue Viibryd 30mg daily, continue trazodone 50mg at bedtime for insomnia. Stop Lexapro. Stopped Luvox. Stopped Wellbutrin. BZD side effects: Discussed with patient adverse effects of longer term/frequent use of BZD, potential to develop tolerance to the dose effects and develop dependence, and potential for addiction. Advise patient not to drive or operate heavy machinery immediately after taking the medication. Also educated patient about safe use/and keeping meds safely, and to not share medications with family/friends. Advised not to combine BZD with EtOH.  Discussed risks and benefits of Wellbutrin including but not limited to decreased seizure threshold, dizziness, lightheadedness, insomnia, dry mouth, constipation, weight loss, anorexia, hypertension, sweating, rash, GI upset, tremor, tinnitus, induction of verito.  Trazodone side effects including but not limited to sedation, dry mouth constipation, headache, serotonin syndrome, orthostatic hypotension and priapism discussed.  SSRI side effects including but not limited to GI side effects, headaches, dizziness, serotonin syndrome, hyponatremia, QT prolongation, weight gain, decreased libido, and black box warning of SI in patients younger than 24 were discussed. Propranolol side effects discussed including risk to lower heart rate and cause orthostasis/hypotension. -Discussed recommendation for aerobic exercise -Discussed sleep hygiene -Discussed aromatherapy for sleep and anxiety -Educated patient of importance of remaining abstinent from drugs and alcohol. -Emergency procedures were discussed: pt. educated to call 911 or go to nearest ER for worsening of symptoms/suicidal/homicidal ideation. -Seeing Dr. Ray Campbell for therapy. -RTC in 4 weeks or earlier as needed -Patient given opportunity to ask questions and their questions were answered and they expressed understanding and agreement with above plan.

## 2024-05-14 NOTE — HISTORY OF PRESENT ILLNESS
[FreeTextEntry1] : Social: Taiwo is graduating from law school this weekend. Now to see taiwo every day.  Medications: feels the cross is going well. Had a bit of an increase in anxiety, for the first and second day had some nausea but it went away quickly. Lexapro 5mg: Denies flu-like symptoms with the cross.  Vilazodone 30mg: Some up-tick in anxiety and intrusive thoughts with the cross. Trazodone 50mg: taking nightly  Mood: "a little anxious." Intrusive thoughts: 50% of the day having intrusive thoughts. Intrusive thought about confessing, and easiness of confessing.  Compulsions: less reassurance seeking Anxiety: 4/10 Panic attacks: denies Motivation: good Anhedonia: denies Appetite: good Sleep: asleep 12-12:15am, waking up 7-7:15am. Denies interruptions.  Energy: good Focus: good Guilt/worthlessness: endorses feelings of guilt Thoughts of death: none elicited Thoughts of harming self: none elicited Thoughts of harming others: none elicited  EtOH use: denies Drug use: denies Tobacco use: denies Caffeine use: 1-2 times per week caffeinated soda, no coffee.

## 2024-05-14 NOTE — PHYSICAL EXAM
[None] : none [Cooperative] : cooperative [Anxious] : anxious [Full] : full [Clear] : clear [Linear/Goal Directed] : linear/goal directed [Average] : average [WNL] : within normal limits [FreeTextEntry8] : "a little anxious." [de-identified] : anxious, congruent with stated mood, ranging to smiling, appropriate to content [FreeTextEntry7] : denies SIIP/NSSIIP/HIIP, no overt delusions

## 2024-05-21 ENCOUNTER — NON-APPOINTMENT (OUTPATIENT)
Age: 29
End: 2024-05-21

## 2024-05-21 NOTE — DISCUSSION/SUMMARY
[FreeTextEntry1] : Pt called and asked to speak with writer, writer called back: Pt said that after he stopped Lexapro he noticed a spike in anxiety, almost panic attacks. Pt is going away for a bachelor's party this weekend. Discussed plan for pt to move his next appt up to sometime next week so that we can discuss the option for increasing the vilazodone dose as needed, should the elevated anxiety persist.  No acute issues elicited at this time.

## 2024-05-30 ENCOUNTER — APPOINTMENT (OUTPATIENT)
Dept: PSYCHIATRY | Facility: CLINIC | Age: 29
End: 2024-05-30
Payer: COMMERCIAL

## 2024-05-30 PROCEDURE — 99214 OFFICE O/P EST MOD 30 MIN: CPT

## 2024-05-30 PROCEDURE — G2211 COMPLEX E/M VISIT ADD ON: CPT

## 2024-05-30 NOTE — HISTORY OF PRESENT ILLNESS
[FreeTextEntry1] : Social: Attended bachelor party last weekend, enjoyed it. Came back Monday. Denies gambling.  Medications: Vilazodone 30mg: partial benefit, wants to increase to 40mg daily. Denies side effects. Trazodone 50mg: taking nightly, denies side effects.  Mood: "good, pretty calm" Anxiety: anxiety was worse during the interval. Lately has up and down. Panic attacks: denies "full blown" panic attacks, but had feelings of tingling in hands and feet related to anxiety. Motivation: good unless feeling anxious Anhedonia: denies Appetite: ok Sleep: 3-4 interruptions lasting 15-20 min duration, asleep 12-12:15am, waking up 7-7:15am. +middle insomnia. Energy: good unless anxious Focus: good unless anxious Guilt/worthlessness: endorses feelings of guilt, low self esteem. +intrusive thoughts. Thoughts of death: denies Thoughts of harming self: denies Thoughts of harming others: denies  EtOH use: from 7pm - 2am had 7-8 units of alcohol at bachelor party.  Drug use: denies Tobacco use: denies Caffeine use: 1-2 times per week caffeinated soda, no coffee.

## 2024-05-30 NOTE — PHYSICAL EXAM
[None] : none [Cooperative] : cooperative [Euthymic] : euthymic [Full] : full [Clear] : clear [Linear/Goal Directed] : linear/goal directed [Average] : average [WNL] : within normal limits [FreeTextEntry8] : "good, pretty calm." [de-identified] : less anxious, congruent with stated mood, ranging to smiling, appropriate to content [FreeTextEntry7] : denies SIIP/NSSIIP/HIIP, no overt delusions

## 2024-05-30 NOTE — PLAN
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  On initial assessment, notably, pt had period of 2-3 weeks of anxious mood, amotivation/anhedonia, low appetite, low energy, poor focus, feelings of guilt, middle insomnia, in the context of intrusive thoughts about infidelity and talking with his partner about this. Given the family of of OCD (father, two sisters) and depression (mother), the differential includes OCD, MDD, gambling disorder, r/o anxiety (though anxiety is situationally related to intrusive thoughts, not general). Continuing Lexapro titration, partial improvement though side effects notable for nausea and dry mouth, anxiety.  Labs: 5/11/2023: TSH 0.76 wnl, free thryoxine 1.3 wnl 3/22/2024: chol 182 wnl, HDL 53 wnl, trig 108 wnl,  H, A1c 5.0%, CMP wnl Cr 0.95 wnl, Na 137 wnl, ALT 19 wnl, AST 16 wnl, CBC wnl except monocytes 14.7 H  10/6/2023: Anxiety and depressive symptom improved on Lexapro, intrusive thoughts decreased from 80% of time to 50% of time. Increasing Lexapro to 15mg daily dose. Delayed ejaculation as notable side effect to monitor. 10/17/2023: Discussed that transient side effects may persist for 2 weeks after dose increase, pt in agreement with the plan to maintain current dose of Lexapro 15mg nightly currently, with option to cross to Prozac (worked for pt's sister) to be revisited in the future as needed, though Lexapro has had partial benefit and dose is not yet optimized. Pt in agreement with the plan to maintain at 15mg for 3 more weeks. 11/6/2023: Primary side effect of delayed ejaculation, denies other side effects as transient increase in anxiety resolved within 2 weeks or so of having increased the dose to Lexapro 15mg daily. Discussed the option for Wellbutrin in the future as an adjunct for sexual side effects. Anxiety improving 3/10 currently, intrusive thoughts 5-10 times a day of guilt about his past when around his fiance. Plan to increase Lexapro to 20mg daily to address intrusive thoughts. 11/27/2023: Low motivation, low energy, feeling "flat". To start Wellbutrin XL 150mg daily as an adjunct. 12/18/2023: Crossing from Lexapro to Viibryd due to delayed ejaculation, start Viibryd 10mg daily, decrease Lexapro to 10mg daily. Continuing Wellbutrin XL 150mg daily, and trazodone at bedtime as needed for sleep. 1/2/2024: Did not start Viibryd due to financial cost. Partial efficacy of Lexapro, wants to stop Wellbutrin due to concern for increased anxiety. Plan to stop Wellbutrin. Continue Lexapro 20mg daily for now, pt to consider genetic testing to guide choice of switch. Also considering Prozac since it helped with family members. Continuing trazodone 50mg to 100mg nightly for insomnia. 2/8/2024: Anxiety 6/10, intrusive thoughts 75% of the time. Plan to increase Lexapro to 30mg daily, pt received Klonopin 0.5mg prescription, to take as needed for anxiety induced by transient side effects of dose increase. 3/5/2024: Continuing Lexapro 30mg, trazodone 50mg to 100mg at bedtime, has not taken Klonopin. Plan to continue current meds, discussed option for clomipramine or Luvox in the future if needed. Does not meet criteria for ADHD. 4/9/2024: Decreasing Lexapro to 20mg, starting Luvox 50mg daily. Continue trazodone 50mg nightly. Discussed risk for serotonin syndrome, pt expressed understanding. Crossing from Lexapro to Luvox. Anxiety is improved, but ongoing intrusive thoughts. 4/16/2024: Stopping Luvox due to sexual side effects, starting vilazodone 10mg daily. Continuing Lexapro 20mg for now, plan to cross to Viibryd. Ongoing intrusive thoughts/anxiety. 4/25/2024: Tolerating cross to Viibryd, anxiety 4/10, intrusive thoughts decreasing in intensity. Increase Viibryd to 20mg daily, decrease Lexapro to 10mg daily, continue trazodone 50mg at bedtime for insomnia. Stopped Luvox. 5/9/2024: Tolerating cross to Viibryd. Depression and intrusive thoughts improving. Increasing Viibryd 30mg daily, decrease Lexapro to 5mg daily. Continue trazodone 50mg nightly. 5/14/2024: Stopping Lexapro, continue Viibryd 30mg daily for not. Continue trazodone 50mg at bedtime for insomnia. OCD symptoms and anxiety mildly worsened, though could be transient side effect of increased Viibryd vs situational anxiety about fiance graduating law school and resultant life changes. Generally tolerating the cross well. 5/30/2024: Increasing vilazodone to 40mg daily. Anxiety up and down, significant intrusive thoughts. Continue trazodone 50mg at bedtime.   PLAN -Discussed the diagnosis, treatment, alternatives to recommended treatment, risk Vs benefits of treatment and no treatment and alternative treatments. -Medication: Increase Viibryd 40mg daily, continue trazodone 50mg at bedtime for insomnia. Stop Lexapro. Stopped Luvox. Stopped Wellbutrin. BZD side effects: Discussed with patient adverse effects of longer term/frequent use of BZD, potential to develop tolerance to the dose effects and develop dependence, and potential for addiction. Advise patient not to drive or operate heavy machinery immediately after taking the medication. Also educated patient about safe use/and keeping meds safely, and to not share medications with family/friends. Advised not to combine BZD with EtOH.  Discussed risks and benefits of Wellbutrin including but not limited to decreased seizure threshold, dizziness, lightheadedness, insomnia, dry mouth, constipation, weight loss, anorexia, hypertension, sweating, rash, GI upset, tremor, tinnitus, induction of verito.  Trazodone side effects including but not limited to sedation, dry mouth constipation, headache, serotonin syndrome, orthostatic hypotension and priapism discussed.  SSRI side effects including but not limited to GI side effects, headaches, dizziness, serotonin syndrome, hyponatremia, QT prolongation, weight gain, decreased libido, and black box warning of SI in patients younger than 24 were discussed. Propranolol side effects discussed including risk to lower heart rate and cause orthostasis/hypotension. -Discussed recommendation for aerobic exercise -Discussed sleep hygiene -Discussed aromatherapy for sleep and anxiety -Educated patient of importance of remaining abstinent from drugs and alcohol. -Emergency procedures were discussed: pt. educated to call 911 or go to nearest ER for worsening of symptoms/suicidal/homicidal ideation. -Seeing Dr. Ray Campbell for therapy. -RTC in 4 weeks or earlier as needed -Patient given opportunity to ask questions and their questions were answered and they expressed understanding and agreement with above plan.  ISTOP Reference #: [id number]  Practitioner Count: 0 Pharmacy Count: 0 Current Opioid Prescriptions: 0 Current Benzodiazepine Prescriptions: 0 Current Stimulant Prescriptions: 0   Patient Demographic Information (PDI)     PDI	First Name	Last Name	Birth Date	Gender	Street Address	[facility]	Zip Code [place]	Eusebio Mcbride	1995	Male	[address] [place] [facility]	[address]  Prescription Information    PDI Filter:   PDI	My Rx	Current Rx	Drug Type	Rx Written	Rx Dispensed	Drug	Quantity	Days Supply	Prescriber Name	Prescriber JACOB #	Payment Method	Dispenser A	N	N	B	01/23/2024	01/25/2024	clonazepam 0.5 mg tablet	30	15	Fei Quiros	[id number]	Insurance	[facility] #[id number]

## 2024-06-27 ENCOUNTER — APPOINTMENT (OUTPATIENT)
Dept: PSYCHIATRY | Facility: CLINIC | Age: 29
End: 2024-06-27
Payer: COMMERCIAL

## 2024-06-27 DIAGNOSIS — F32.A DEPRESSION, UNSPECIFIED: ICD-10-CM

## 2024-06-27 DIAGNOSIS — G47.00 INSOMNIA, UNSPECIFIED: ICD-10-CM

## 2024-06-27 DIAGNOSIS — F42.2 MIXED OBSESSIONAL THOUGHTS AND ACTS: ICD-10-CM

## 2024-06-27 DIAGNOSIS — F63.0 PATHOLOGICAL GAMBLING: ICD-10-CM

## 2024-06-27 DIAGNOSIS — F42.9 OBSESSIVE-COMPULSIVE DISORDER, UNSPECIFIED: ICD-10-CM

## 2024-06-27 PROCEDURE — G2211 COMPLEX E/M VISIT ADD ON: CPT

## 2024-06-27 PROCEDURE — 99214 OFFICE O/P EST MOD 30 MIN: CPT

## 2024-08-29 ENCOUNTER — APPOINTMENT (OUTPATIENT)
Dept: PSYCHIATRY | Facility: CLINIC | Age: 29
End: 2024-08-29
Payer: SELF-PAY

## 2024-08-29 DIAGNOSIS — Z86.59 PERSONAL HISTORY OF OTHER MENTAL AND BEHAVIORAL DISORDERS: ICD-10-CM

## 2024-08-29 DIAGNOSIS — F63.0 PATHOLOGICAL GAMBLING: ICD-10-CM

## 2024-08-29 DIAGNOSIS — F32.A DEPRESSION, UNSPECIFIED: ICD-10-CM

## 2024-08-29 DIAGNOSIS — G47.00 INSOMNIA, UNSPECIFIED: ICD-10-CM

## 2024-08-29 DIAGNOSIS — F42.2 MIXED OBSESSIONAL THOUGHTS AND ACTS: ICD-10-CM

## 2024-08-29 PROCEDURE — G2211 COMPLEX E/M VISIT ADD ON: CPT

## 2024-08-29 PROCEDURE — 99214 OFFICE O/P EST MOD 30 MIN: CPT

## 2024-08-29 RX ORDER — TRAZODONE HYDROCHLORIDE 50 MG/1
50 TABLET ORAL
Qty: 90 | Refills: 0 | Status: ACTIVE | COMMUNITY
Start: 2024-08-29 | End: 1900-01-01

## 2024-08-29 NOTE — HISTORY OF PRESENT ILLNESS
[FreeTextEntry1] : Social: Wedding in 2 weeks, feeling good about this, feels that the distractions prevent him from getting anxious since he is so busy so he is not ruminating. Pt saw a neurologist for the "jolt feeling".  Medications: Vilazodone 40mg: side effect of "jolt feeling", otherwise feels it helps greatly. Discussed option to take as 20mg in AM and 20mg in PM to maintain a more steady state to reduce w/d symptoms of brain zaps. Trazodone 50mg: endorses adherence, one pill at bedtime.   Mood: "good" Intrusive thoughts: reduced recently Anxiety: reduced recently. Panic attacks: none elicited Motivation: good Anhedonia: denies Appetite: good Sleep: good Energy: good Focus: intant Guilt/worthlessness: reduced Thoughts of death: none elicited Thoughts of harming self: none elicited Thoughts of harming others: none elicited  EtOH use: denies Drug use: denies Tobacco use: denies Caffeine use: 2 diet sodas per week

## 2024-08-29 NOTE — PHYSICAL EXAM
[None] : none [Cooperative] : cooperative [Euthymic] : euthymic [Full] : full [Clear] : clear [Linear/Goal Directed] : linear/goal directed [Average] : average [WNL] : within normal limits [FreeTextEntry8] : "good, pretty calm." [de-identified] : less anxious, congruent with stated mood, ranging to smiling, appropriate to content [FreeTextEntry7] : denies suicidal ideation/intent/plan or non-suicidal self-injurious ideation/intent/plan or homicidal ideation/intent/plan, no overt delusions

## 2024-08-29 NOTE — PHYSICAL EXAM
[None] : none [Cooperative] : cooperative [Euthymic] : euthymic [Full] : full [Clear] : clear [Linear/Goal Directed] : linear/goal directed [Average] : average [WNL] : within normal limits [FreeTextEntry8] : "good, pretty calm." [de-identified] : less anxious, congruent with stated mood, ranging to smiling, appropriate to content [FreeTextEntry7] : denies suicidal ideation/intent/plan or non-suicidal self-injurious ideation/intent/plan or homicidal ideation/intent/plan, no overt delusions

## 2024-08-29 NOTE — PLAN
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  On initial assessment, notably, pt had period of 2-3 weeks of anxious mood, amotivation/anhedonia, low appetite, low energy, poor focus, feelings of guilt, middle insomnia, in the context of intrusive thoughts about infidelity and talking with his partner about this. Given the family of of OCD (father, two sisters) and depression (mother), the differential includes OCD, MDD, gambling disorder, r/o anxiety (though anxiety is situationally related to intrusive thoughts, not general). Continuing Lexapro titration, partial improvement though side effects notable for nausea and dry mouth, anxiety.  Labs: 5/11/2023: TSH 0.76 wnl, free thryoxine 1.3 wnl 3/22/2024: chol 182 wnl, HDL 53 wnl, trig 108 wnl,  H, A1c 5.0%, CMP wnl Cr 0.95 wnl, Na 137 wnl, ALT 19 wnl, AST 16 wnl, CBC wnl except monocytes 14.7 H  10/6/2023: Anxiety and depressive symptom improved on Lexapro, intrusive thoughts decreased from 80% of time to 50% of time. Increasing Lexapro to 15mg daily dose. Delayed ejaculation as notable side effect to monitor. 10/17/2023: Discussed that transient side effects may persist for 2 weeks after dose increase, pt in agreement with the plan to maintain current dose of Lexapro 15mg nightly currently, with option to cross to Prozac (worked for pt's sister) to be revisited in the future as needed, though Lexapro has had partial benefit and dose is not yet optimized. Pt in agreement with the plan to maintain at 15mg for 3 more weeks. 11/6/2023: Primary side effect of delayed ejaculation, denies other side effects as transient increase in anxiety resolved within 2 weeks or so of having increased the dose to Lexapro 15mg daily. Discussed the option for Wellbutrin in the future as an adjunct for sexual side effects. Anxiety improving 3/10 currently, intrusive thoughts 5-10 times a day of guilt about his past when around his fiance. Plan to increase Lexapro to 20mg daily to address intrusive thoughts. 11/27/2023: Low motivation, low energy, feeling "flat". To start Wellbutrin XL 150mg daily as an adjunct. 12/18/2023: Crossing from Lexapro to Viibryd due to delayed ejaculation, start Viibryd 10mg daily, decrease Lexapro to 10mg daily. Continuing Wellbutrin XL 150mg daily, and trazodone at bedtime as needed for sleep. 1/2/2024: Did not start Viibryd due to financial cost. Partial efficacy of Lexapro, wants to stop Wellbutrin due to concern for increased anxiety. Plan to stop Wellbutrin. Continue Lexapro 20mg daily for now, pt to consider genetic testing to guide choice of switch. Also considering Prozac since it helped with family members. Continuing trazodone 50mg to 100mg nightly for insomnia. 2/8/2024: Anxiety 6/10, intrusive thoughts 75% of the time. Plan to increase Lexapro to 30mg daily, pt received Klonopin 0.5mg prescription, to take as needed for anxiety induced by transient side effects of dose increase. 3/5/2024: Continuing Lexapro 30mg, trazodone 50mg to 100mg at bedtime, has not taken Klonopin. Plan to continue current meds, discussed option for clomipramine or Luvox in the future if needed. Does not meet criteria for ADHD. 4/9/2024: Decreasing Lexapro to 20mg, starting Luvox 50mg daily. Continue trazodone 50mg nightly. Discussed risk for serotonin syndrome, pt expressed understanding. Crossing from Lexapro to Luvox. Anxiety is improved, but ongoing intrusive thoughts. 4/16/2024: Stopping Luvox due to sexual side effects, starting vilazodone 10mg daily. Continuing Lexapro 20mg for now, plan to cross to Viibryd. Ongoing intrusive thoughts/anxiety. 4/25/2024: Tolerating cross to Viibryd, anxiety 4/10, intrusive thoughts decreasing in intensity. Increase Viibryd to 20mg daily, decrease Lexapro to 10mg daily, continue trazodone 50mg at bedtime for insomnia. Stopped Luvox. 5/9/2024: Tolerating cross to Viibryd. Depression and intrusive thoughts improving. Increasing Viibryd 30mg daily, decrease Lexapro to 5mg daily. Continue trazodone 50mg nightly. 5/14/2024: Stopping Lexapro, continue Viibryd 30mg daily for not. Continue trazodone 50mg at bedtime for insomnia. OCD symptoms and anxiety mildly worsened, though could be transient side effect of increased Viibryd vs situational anxiety about fiance graduating law school and resultant life changes. Generally tolerating the cross well. 5/30/2024: Increasing vilazodone to 40mg daily. Anxiety up and down, significant intrusive thoughts. Continue trazodone 50mg at bedtime. 6/25/2024: Improved anxiety and intrusive thoughts. Continue vilazodone 40mg daily. Decrease trazodone to 25mg at bedtime due to pt's wish to eventually come off of trazodone. 8/29/2024: Much improved anxiety, side effect of brain zaps, discussed option to take vilazodone as 20mg in AM and 20mg in PM to maintain a more steady state to reduce w/d symptoms of brain zaps. Continue trazodone 50mg at bedtime. Pt is getting  in 2 weeks.  PLAN -Discussed the diagnosis, treatment, alternatives to recommended treatment, risk Vs benefits of treatment and no treatment and alternative treatments. -Medication: Continue Viibryd 40mg daily (can take as 20mg in the AM and 20mg in the PM half a pill each time), decrease trazodone to 25mg at bedtime for insomnia. Stop Lexapro. Stopped Luvox. Stopped Wellbutrin. BZD side effects: Discussed with patient adverse effects of longer term/frequent use of BZD, potential to develop tolerance to the dose effects and develop dependence, and potential for addiction. Advise patient not to drive or operate heavy machinery immediately after taking the medication. Also educated patient about safe use/and keeping meds safely, and to not share medications with family/friends. Advised not to combine BZD with EtOH.  Discussed risks and benefits of Wellbutrin including but not limited to decreased seizure threshold, dizziness, lightheadedness, insomnia, dry mouth, constipation, weight loss, anorexia, hypertension, sweating, rash, GI upset, tremor, tinnitus, induction of verito.  Trazodone side effects including but not limited to sedation, dry mouth constipation, headache, serotonin syndrome, orthostatic hypotension and priapism discussed.  SSRI side effects including but not limited to GI side effects, headaches, dizziness, serotonin syndrome, hyponatremia, QT prolongation, weight gain, decreased libido, and black box warning of SI in patients younger than 24 were discussed. Propranolol side effects discussed including risk to lower heart rate and cause orthostasis/hypotension. -Discussed recommendation for aerobic exercise -Discussed sleep hygiene -Discussed aromatherapy for sleep and anxiety -Educated patient of importance of remaining abstinent from drugs and alcohol. -Emergency procedures were discussed: pt. educated to call 911 or go to nearest ER for worsening of symptoms/suicidal/homicidal ideation. -Seeing Dr. Ray Campbell for therapy. -RTC in 6-8 weeks or earlier as needed -Patient given opportunity to ask questions and their questions were answered and they expressed understanding and agreement with above plan.

## 2024-08-29 NOTE — PLAN
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  On initial assessment, notably, pt had period of 2-3 weeks of anxious mood, amotivation/anhedonia, low appetite, low energy, poor focus, feelings of guilt, middle insomnia, in the context of intrusive thoughts about infidelity and talking with his partner about this. Given the family of of OCD (father, two sisters) and depression (mother), the differential includes OCD, MDD, gambling disorder, r/o anxiety (though anxiety is situationally related to intrusive thoughts, not general). Continuing Lexapro titration, partial improvement though side effects notable for nausea and dry mouth, anxiety.  Labs: 5/11/2023: TSH 0.76 wnl, free thryoxine 1.3 wnl 3/22/2024: chol 182 wnl, HDL 53 wnl, trig 108 wnl,  H, A1c 5.0%, CMP wnl Cr 0.95 wnl, Na 137 wnl, ALT 19 wnl, AST 16 wnl, CBC wnl except monocytes 14.7 H  10/6/2023: Anxiety and depressive symptom improved on Lexapro, intrusive thoughts decreased from 80% of time to 50% of time. Increasing Lexapro to 15mg daily dose. Delayed ejaculation as notable side effect to monitor. 10/17/2023: Discussed that transient side effects may persist for 2 weeks after dose increase, pt in agreement with the plan to maintain current dose of Lexapro 15mg nightly currently, with option to cross to Prozac (worked for pt's sister) to be revisited in the future as needed, though Lexapro has had partial benefit and dose is not yet optimized. Pt in agreement with the plan to maintain at 15mg for 3 more weeks. 11/6/2023: Primary side effect of delayed ejaculation, denies other side effects as transient increase in anxiety resolved within 2 weeks or so of having increased the dose to Lexapro 15mg daily. Discussed the option for Wellbutrin in the future as an adjunct for sexual side effects. Anxiety improving 3/10 currently, intrusive thoughts 5-10 times a day of guilt about his past when around his fiance. Plan to increase Lexapro to 20mg daily to address intrusive thoughts. 11/27/2023: Low motivation, low energy, feeling "flat". To start Wellbutrin XL 150mg daily as an adjunct. 12/18/2023: Crossing from Lexapro to Viibryd due to delayed ejaculation, start Viibryd 10mg daily, decrease Lexapro to 10mg daily. Continuing Wellbutrin XL 150mg daily, and trazodone at bedtime as needed for sleep. 1/2/2024: Did not start Viibryd due to financial cost. Partial efficacy of Lexapro, wants to stop Wellbutrin due to concern for increased anxiety. Plan to stop Wellbutrin. Continue Lexapro 20mg daily for now, pt to consider genetic testing to guide choice of switch. Also considering Prozac since it helped with family members. Continuing trazodone 50mg to 100mg nightly for insomnia. 2/8/2024: Anxiety 6/10, intrusive thoughts 75% of the time. Plan to increase Lexapro to 30mg daily, pt received Klonopin 0.5mg prescription, to take as needed for anxiety induced by transient side effects of dose increase. 3/5/2024: Continuing Lexapro 30mg, trazodone 50mg to 100mg at bedtime, has not taken Klonopin. Plan to continue current meds, discussed option for clomipramine or Luvox in the future if needed. Does not meet criteria for ADHD. 4/9/2024: Decreasing Lexapro to 20mg, starting Luvox 50mg daily. Continue trazodone 50mg nightly. Discussed risk for serotonin syndrome, pt expressed understanding. Crossing from Lexapro to Luvox. Anxiety is improved, but ongoing intrusive thoughts. 4/16/2024: Stopping Luvox due to sexual side effects, starting vilazodone 10mg daily. Continuing Lexapro 20mg for now, plan to cross to Viibryd. Ongoing intrusive thoughts/anxiety. 4/25/2024: Tolerating cross to Viibryd, anxiety 4/10, intrusive thoughts decreasing in intensity. Increase Viibryd to 20mg daily, decrease Lexapro to 10mg daily, continue trazodone 50mg at bedtime for insomnia. Stopped Luvox. 5/9/2024: Tolerating cross to Viibryd. Depression and intrusive thoughts improving. Increasing Viibryd 30mg daily, decrease Lexapro to 5mg daily. Continue trazodone 50mg nightly. 5/14/2024: Stopping Lexapro, continue Viibryd 30mg daily for not. Continue trazodone 50mg at bedtime for insomnia. OCD symptoms and anxiety mildly worsened, though could be transient side effect of increased Viibryd vs situational anxiety about fiance graduating law school and resultant life changes. Generally tolerating the cross well. 5/30/2024: Increasing vilazodone to 40mg daily. Anxiety up and down, significant intrusive thoughts. Continue trazodone 50mg at bedtime. 6/25/2024: Improved anxiety and intrusive thoughts. Continue vilazodone 40mg daily. Decrease trazodone to 25mg at bedtime due to pt's wish to eventually come off of trazodone. 8/29/2024: Much improved anxiety, side effect of brain zaps, discussed option to take vilazodone as 20mg in AM and 20mg in PM to maintain a more steady state to reduce w/d symptoms of brain zaps. Continue trazodone 50mg at bedtime. Pt is getting  in 2 weeks.  PLAN -Discussed the diagnosis, treatment, alternatives to recommended treatment, risk Vs benefits of treatment and no treatment and alternative treatments. -Medication: Continue Viibryd 40mg daily (can take as 20mg in the AM and 20mg in the PM half a pill each time), decrease trazodone to 25mg at bedtime for insomnia. Stop Lexapro. Stopped Luvox. Stopped Wellbutrin. BZD side effects: Discussed with patient adverse effects of longer term/frequent use of BZD, potential to develop tolerance to the dose effects and develop dependence, and potential for addiction. Advise patient not to drive or operate heavy machinery immediately after taking the medication. Also educated patient about safe use/and keeping meds safely, and to not share medications with family/friends. Advised not to combine BZD with EtOH.  Discussed risks and benefits of Wellbutrin including but not limited to decreased seizure threshold, dizziness, lightheadedness, insomnia, dry mouth, constipation, weight loss, anorexia, hypertension, sweating, rash, GI upset, tremor, tinnitus, induction of verito.  Trazodone side effects including but not limited to sedation, dry mouth constipation, headache, serotonin syndrome, orthostatic hypotension and priapism discussed.  SSRI side effects including but not limited to GI side effects, headaches, dizziness, serotonin syndrome, hyponatremia, QT prolongation, weight gain, decreased libido, and black box warning of SI in patients younger than 24 were discussed. Propranolol side effects discussed including risk to lower heart rate and cause orthostasis/hypotension. -Discussed recommendation for aerobic exercise -Discussed sleep hygiene -Discussed aromatherapy for sleep and anxiety -Educated patient of importance of remaining abstinent from drugs and alcohol. -Emergency procedures were discussed: pt. educated to call 911 or go to nearest ER for worsening of symptoms/suicidal/homicidal ideation. -Seeing Dr. Ray Campbell for therapy. -RTC in 6-8 weeks or earlier as needed -Patient given opportunity to ask questions and their questions were answered and they expressed understanding and agreement with above plan.

## 2024-10-17 ENCOUNTER — APPOINTMENT (OUTPATIENT)
Dept: PSYCHIATRY | Facility: CLINIC | Age: 29
End: 2024-10-17
Payer: SELF-PAY

## 2024-10-17 DIAGNOSIS — F42.2 MIXED OBSESSIONAL THOUGHTS AND ACTS: ICD-10-CM

## 2024-10-17 DIAGNOSIS — F32.A DEPRESSION, UNSPECIFIED: ICD-10-CM

## 2024-10-17 PROCEDURE — 99214 OFFICE O/P EST MOD 30 MIN: CPT

## 2024-10-17 PROCEDURE — G2211 COMPLEX E/M VISIT ADD ON: CPT

## 2024-11-21 ENCOUNTER — APPOINTMENT (OUTPATIENT)
Dept: PSYCHIATRY | Facility: CLINIC | Age: 29
End: 2024-11-21

## 2025-03-17 ENCOUNTER — APPOINTMENT (OUTPATIENT)
Dept: PSYCHIATRY | Facility: CLINIC | Age: 30
End: 2025-03-17
Payer: COMMERCIAL

## 2025-03-17 DIAGNOSIS — F42.2 MIXED OBSESSIONAL THOUGHTS AND ACTS: ICD-10-CM

## 2025-03-17 DIAGNOSIS — F32.A DEPRESSION, UNSPECIFIED: ICD-10-CM

## 2025-03-17 PROCEDURE — G2211 COMPLEX E/M VISIT ADD ON: CPT | Mod: NC

## 2025-03-17 PROCEDURE — 99214 OFFICE O/P EST MOD 30 MIN: CPT

## 2025-06-16 ENCOUNTER — APPOINTMENT (OUTPATIENT)
Dept: PSYCHIATRY | Facility: CLINIC | Age: 30
End: 2025-06-16
Payer: COMMERCIAL

## 2025-06-16 PROCEDURE — 99214 OFFICE O/P EST MOD 30 MIN: CPT

## 2025-06-16 PROCEDURE — G2211 COMPLEX E/M VISIT ADD ON: CPT | Mod: NC

## 2025-09-08 ENCOUNTER — APPOINTMENT (OUTPATIENT)
Dept: PSYCHIATRY | Facility: CLINIC | Age: 30
End: 2025-09-08
Payer: SELF-PAY

## 2025-09-08 DIAGNOSIS — F42.2 MIXED OBSESSIONAL THOUGHTS AND ACTS: ICD-10-CM

## 2025-09-08 DIAGNOSIS — F32.A DEPRESSION, UNSPECIFIED: ICD-10-CM

## 2025-09-08 PROCEDURE — 99214 OFFICE O/P EST MOD 30 MIN: CPT

## 2025-09-08 PROCEDURE — G2211 COMPLEX E/M VISIT ADD ON: CPT
